# Patient Record
Sex: MALE | Race: ASIAN | NOT HISPANIC OR LATINO | Employment: FULL TIME | URBAN - METROPOLITAN AREA
[De-identification: names, ages, dates, MRNs, and addresses within clinical notes are randomized per-mention and may not be internally consistent; named-entity substitution may affect disease eponyms.]

---

## 2017-11-06 ENCOUNTER — HOSPITAL ENCOUNTER (OUTPATIENT)
Dept: LAB | Facility: MEDICAL CENTER | Age: 52
End: 2017-11-06
Attending: NURSE PRACTITIONER
Payer: COMMERCIAL

## 2017-11-06 LAB
GFR SERPL CREATININE-BSD FRML MDRD: >60 ML/MIN/1.73 M 2
TRANSFERRIN SERPL-MCNC: 240 MG/DL (ref 200–370)

## 2017-11-06 PROCEDURE — 80053 COMPREHEN METABOLIC PANEL: CPT

## 2017-11-06 PROCEDURE — 80061 LIPID PANEL: CPT

## 2017-11-06 PROCEDURE — 83036 HEMOGLOBIN GLYCOSYLATED A1C: CPT

## 2017-11-06 PROCEDURE — 36415 COLL VENOUS BLD VENIPUNCTURE: CPT

## 2017-11-06 PROCEDURE — 82306 VITAMIN D 25 HYDROXY: CPT

## 2017-11-06 PROCEDURE — 82607 VITAMIN B-12: CPT

## 2017-11-06 PROCEDURE — 83735 ASSAY OF MAGNESIUM: CPT

## 2017-11-06 PROCEDURE — 83550 IRON BINDING TEST: CPT

## 2017-11-06 PROCEDURE — 84207 ASSAY OF VITAMIN B-6: CPT

## 2017-11-06 PROCEDURE — 85025 COMPLETE CBC W/AUTO DIFF WBC: CPT

## 2017-11-06 PROCEDURE — 86225 DNA ANTIBODY NATIVE: CPT

## 2017-11-06 PROCEDURE — 86235 NUCLEAR ANTIGEN ANTIBODY: CPT

## 2017-11-06 PROCEDURE — 84443 ASSAY THYROID STIM HORMONE: CPT

## 2017-11-06 PROCEDURE — 84425 ASSAY OF VITAMIN B-1: CPT

## 2017-11-06 PROCEDURE — 86038 ANTINUCLEAR ANTIBODIES: CPT

## 2017-11-06 PROCEDURE — 82746 ASSAY OF FOLIC ACID SERUM: CPT

## 2017-11-06 PROCEDURE — 86696 HERPES SIMPLEX TYPE 2 TEST: CPT

## 2017-11-06 PROCEDURE — 86695 HERPES SIMPLEX TYPE 1 TEST: CPT

## 2017-11-06 PROCEDURE — 82728 ASSAY OF FERRITIN: CPT

## 2017-11-06 PROCEDURE — 84466 ASSAY OF TRANSFERRIN: CPT

## 2017-11-06 PROCEDURE — 86694 HERPES SIMPLEX NES ANTBDY: CPT

## 2017-11-06 PROCEDURE — 84153 ASSAY OF PSA TOTAL: CPT

## 2017-11-06 PROCEDURE — 83540 ASSAY OF IRON: CPT

## 2017-11-07 LAB
25(OH)D3 SERPL-MCNC: 13 NG/ML (ref 30–100)
ALBUMIN SERPL BCP-MCNC: 3.7 G/DL (ref 3.2–4.9)
ALBUMIN/GLOB SERPL: 1.3 G/DL
ALP SERPL-CCNC: 56 U/L (ref 30–99)
ALT SERPL-CCNC: 19 U/L (ref 2–50)
ANION GAP SERPL CALC-SCNC: 7 MMOL/L (ref 0–11.9)
AST SERPL-CCNC: 20 U/L (ref 12–45)
BASOPHILS # BLD AUTO: 0.4 % (ref 0–1.8)
BASOPHILS # BLD: 0.02 K/UL (ref 0–0.12)
BILIRUB SERPL-MCNC: 0.8 MG/DL (ref 0.1–1.5)
BUN SERPL-MCNC: 19 MG/DL (ref 8–22)
CALCIUM SERPL-MCNC: 8.8 MG/DL (ref 8.5–10.5)
CHLORIDE SERPL-SCNC: 107 MMOL/L (ref 96–112)
CHOLEST SERPL-MCNC: 171 MG/DL (ref 100–199)
CO2 SERPL-SCNC: 24 MMOL/L (ref 20–33)
CREAT SERPL-MCNC: 1 MG/DL (ref 0.5–1.4)
EOSINOPHIL # BLD AUTO: 0.1 K/UL (ref 0–0.51)
EOSINOPHIL NFR BLD: 1.9 % (ref 0–6.9)
ERYTHROCYTE [DISTWIDTH] IN BLOOD BY AUTOMATED COUNT: 36.2 FL (ref 35.9–50)
EST. AVERAGE GLUCOSE BLD GHB EST-MCNC: 114 MG/DL
FERRITIN SERPL-MCNC: 95.3 NG/ML (ref 22–322)
FOLATE SERPL-MCNC: 8.8 NG/ML
GLOBULIN SER CALC-MCNC: 2.8 G/DL (ref 1.9–3.5)
GLUCOSE SERPL-MCNC: 72 MG/DL (ref 65–99)
HBA1C MFR BLD: 5.6 % (ref 0–5.6)
HCT VFR BLD AUTO: 48.8 % (ref 42–52)
HDLC SERPL-MCNC: 62 MG/DL
HGB BLD-MCNC: 16.8 G/DL (ref 14–18)
IMM GRANULOCYTES # BLD AUTO: 0.01 K/UL (ref 0–0.11)
IMM GRANULOCYTES NFR BLD AUTO: 0.2 % (ref 0–0.9)
IRON SATN MFR SERPL: 34 % (ref 15–55)
IRON SERPL-MCNC: 114 UG/DL (ref 50–180)
LDLC SERPL CALC-MCNC: 98 MG/DL
LYMPHOCYTES # BLD AUTO: 2.41 K/UL (ref 1–4.8)
LYMPHOCYTES NFR BLD: 45 % (ref 22–41)
MAGNESIUM SERPL-MCNC: 2 MG/DL (ref 1.5–2.5)
MCH RBC QN AUTO: 28.6 PG (ref 27–33)
MCHC RBC AUTO-ENTMCNC: 34.4 G/DL (ref 33.7–35.3)
MCV RBC AUTO: 83 FL (ref 81.4–97.8)
MONOCYTES # BLD AUTO: 0.39 K/UL (ref 0–0.85)
MONOCYTES NFR BLD AUTO: 7.3 % (ref 0–13.4)
NEUTROPHILS # BLD AUTO: 2.42 K/UL (ref 1.82–7.42)
NEUTROPHILS NFR BLD: 45.2 % (ref 44–72)
NRBC # BLD AUTO: 0 K/UL
NRBC BLD AUTO-RTO: 0 /100 WBC
PLATELET # BLD AUTO: 231 K/UL (ref 164–446)
PMV BLD AUTO: 10.2 FL (ref 9–12.9)
POTASSIUM SERPL-SCNC: 3.8 MMOL/L (ref 3.6–5.5)
PROT SERPL-MCNC: 6.5 G/DL (ref 6–8.2)
PSA SERPL-MCNC: 0.46 NG/ML (ref 0–4)
RBC # BLD AUTO: 5.88 M/UL (ref 4.7–6.1)
SODIUM SERPL-SCNC: 138 MMOL/L (ref 135–145)
TIBC SERPL-MCNC: 337 UG/DL (ref 250–450)
TRIGL SERPL-MCNC: 54 MG/DL (ref 0–149)
TSH SERPL DL<=0.005 MIU/L-ACNC: 4.69 UIU/ML (ref 0.3–3.7)
VIT B12 SERPL-MCNC: 284 PG/ML (ref 211–911)
WBC # BLD AUTO: 5.4 K/UL (ref 4.8–10.8)

## 2017-11-08 LAB
HSV1 GG IGG SER-ACNC: 24.8 IV
HSV1+2 IGG SER IA-ACNC: >22.4 IV
HSV2 GG IGG SER-ACNC: 0.06 IV
NUCLEAR IGG SER QL IA: NORMAL

## 2017-11-09 LAB
VIT B1 BLD-MCNC: 126 NMOL/L (ref 70–180)
VIT B6 SERPL-MCNC: 45 NMOL/L (ref 20–125)

## 2018-09-05 ENCOUNTER — APPOINTMENT (OUTPATIENT)
Dept: RADIOLOGY | Facility: MEDICAL CENTER | Age: 53
DRG: 246 | End: 2018-09-05
Attending: EMERGENCY MEDICINE
Payer: COMMERCIAL

## 2018-09-05 ENCOUNTER — HOSPITAL ENCOUNTER (INPATIENT)
Facility: MEDICAL CENTER | Age: 53
LOS: 2 days | DRG: 246 | End: 2018-09-07
Attending: EMERGENCY MEDICINE | Admitting: INTERNAL MEDICINE
Payer: COMMERCIAL

## 2018-09-05 ENCOUNTER — APPOINTMENT (OUTPATIENT)
Dept: RADIOLOGY | Facility: MEDICAL CENTER | Age: 53
DRG: 246 | End: 2018-09-05
Payer: COMMERCIAL

## 2018-09-05 DIAGNOSIS — I21.21 ST ELEVATION MYOCARDIAL INFARCTION INVOLVING LEFT CIRCUMFLEX CORONARY ARTERY (HCC): ICD-10-CM

## 2018-09-05 LAB
ALBUMIN SERPL BCP-MCNC: 4.2 G/DL (ref 3.2–4.9)
ALBUMIN/GLOB SERPL: 1.6 G/DL
ALP SERPL-CCNC: 75 U/L (ref 30–99)
ALT SERPL-CCNC: 20 U/L (ref 2–50)
ANION GAP SERPL CALC-SCNC: 8 MMOL/L (ref 0–11.9)
APTT PPP: 21.7 SEC (ref 24.7–36)
AST SERPL-CCNC: 22 U/L (ref 12–45)
BASOPHILS # BLD AUTO: 0 % (ref 0–1.8)
BASOPHILS # BLD: 0 K/UL (ref 0–0.12)
BILIRUB SERPL-MCNC: 0.4 MG/DL (ref 0.1–1.5)
BUN SERPL-MCNC: 20 MG/DL (ref 8–22)
CALCIUM SERPL-MCNC: 9.5 MG/DL (ref 8.5–10.5)
CHLORIDE SERPL-SCNC: 108 MMOL/L (ref 96–112)
CO2 SERPL-SCNC: 25 MMOL/L (ref 20–33)
CREAT SERPL-MCNC: 1.29 MG/DL (ref 0.5–1.4)
EKG IMPRESSION: NORMAL
EOSINOPHIL # BLD AUTO: 0.17 K/UL (ref 0–0.51)
EOSINOPHIL NFR BLD: 1.7 % (ref 0–6.9)
ERYTHROCYTE [DISTWIDTH] IN BLOOD BY AUTOMATED COUNT: 36.5 FL (ref 35.9–50)
GLOBULIN SER CALC-MCNC: 2.6 G/DL (ref 1.9–3.5)
GLUCOSE SERPL-MCNC: 140 MG/DL (ref 65–99)
HCT VFR BLD AUTO: 43.9 % (ref 42–52)
HGB BLD-MCNC: 15.1 G/DL (ref 14–18)
INR PPP: 1.04 (ref 0.87–1.13)
LIPASE SERPL-CCNC: 26 U/L (ref 11–82)
LYMPHOCYTES # BLD AUTO: 4.88 K/UL (ref 1–4.8)
LYMPHOCYTES NFR BLD: 47.8 % (ref 22–41)
MANUAL DIFF BLD: NORMAL
MCH RBC QN AUTO: 28 PG (ref 27–33)
MCHC RBC AUTO-ENTMCNC: 34.4 G/DL (ref 33.7–35.3)
MCV RBC AUTO: 81.4 FL (ref 81.4–97.8)
MONOCYTES # BLD AUTO: 0.71 K/UL (ref 0–0.85)
MONOCYTES NFR BLD AUTO: 7 % (ref 0–13.4)
MORPHOLOGY BLD-IMP: NORMAL
NEUTROPHILS # BLD AUTO: 4.44 K/UL (ref 1.82–7.42)
NEUTROPHILS NFR BLD: 43.5 % (ref 44–72)
NRBC # BLD AUTO: 0 K/UL
NRBC BLD-RTO: 0 /100 WBC
PLATELET # BLD AUTO: 246 K/UL (ref 164–446)
PLATELET BLD QL SMEAR: NORMAL
PMV BLD AUTO: 9.9 FL (ref 9–12.9)
POTASSIUM SERPL-SCNC: 3.3 MMOL/L (ref 3.6–5.5)
PROT SERPL-MCNC: 6.8 G/DL (ref 6–8.2)
PROTHROMBIN TIME: 13.3 SEC (ref 12–14.6)
RBC # BLD AUTO: 5.39 M/UL (ref 4.7–6.1)
RBC BLD AUTO: NORMAL
SODIUM SERPL-SCNC: 141 MMOL/L (ref 135–145)
TROPONIN I SERPL-MCNC: 0.11 NG/ML (ref 0–0.04)
WBC # BLD AUTO: 10.2 K/UL (ref 4.8–10.8)

## 2018-09-05 PROCEDURE — 85610 PROTHROMBIN TIME: CPT

## 2018-09-05 PROCEDURE — C1769 GUIDE WIRE: HCPCS

## 2018-09-05 PROCEDURE — 307093 HCHG TR BAND RADIAL

## 2018-09-05 PROCEDURE — 99291 CRITICAL CARE FIRST HOUR: CPT

## 2018-09-05 PROCEDURE — 85730 THROMBOPLASTIN TIME PARTIAL: CPT

## 2018-09-05 PROCEDURE — 93458 L HRT ARTERY/VENTRICLE ANGIO: CPT

## 2018-09-05 PROCEDURE — 99152 MOD SED SAME PHYS/QHP 5/>YRS: CPT

## 2018-09-05 PROCEDURE — 84484 ASSAY OF TROPONIN QUANT: CPT

## 2018-09-05 PROCEDURE — C1887 CATHETER, GUIDING: HCPCS

## 2018-09-05 PROCEDURE — 83880 ASSAY OF NATRIURETIC PEPTIDE: CPT

## 2018-09-05 PROCEDURE — 85347 COAGULATION TIME ACTIVATED: CPT

## 2018-09-05 PROCEDURE — 4A023N7 MEASUREMENT OF CARDIAC SAMPLING AND PRESSURE, LEFT HEART, PERCUTANEOUS APPROACH: ICD-10-PCS | Performed by: INTERNAL MEDICINE

## 2018-09-05 PROCEDURE — C1725 CATH, TRANSLUMIN NON-LASER: HCPCS

## 2018-09-05 PROCEDURE — 027034Z DILATION OF CORONARY ARTERY, ONE ARTERY WITH DRUG-ELUTING INTRALUMINAL DEVICE, PERCUTANEOUS APPROACH: ICD-10-PCS | Performed by: INTERNAL MEDICINE

## 2018-09-05 PROCEDURE — B2111ZZ FLUOROSCOPY OF MULTIPLE CORONARY ARTERIES USING LOW OSMOLAR CONTRAST: ICD-10-PCS | Performed by: INTERNAL MEDICINE

## 2018-09-05 PROCEDURE — C1894 INTRO/SHEATH, NON-LASER: HCPCS

## 2018-09-05 PROCEDURE — 93005 ELECTROCARDIOGRAM TRACING: CPT | Performed by: EMERGENCY MEDICINE

## 2018-09-05 PROCEDURE — 99153 MOD SED SAME PHYS/QHP EA: CPT

## 2018-09-05 PROCEDURE — 770022 HCHG ROOM/CARE - ICU (200)

## 2018-09-05 PROCEDURE — 80053 COMPREHEN METABOLIC PANEL: CPT

## 2018-09-05 PROCEDURE — 360979 HCHG DIAGNOSTIC CATH

## 2018-09-05 PROCEDURE — 85027 COMPLETE CBC AUTOMATED: CPT

## 2018-09-05 PROCEDURE — 5A2204Z RESTORATION OF CARDIAC RHYTHM, SINGLE: ICD-10-PCS | Performed by: INTERNAL MEDICINE

## 2018-09-05 PROCEDURE — 83690 ASSAY OF LIPASE: CPT

## 2018-09-05 PROCEDURE — C1874 STENT, COATED/COV W/DEL SYS: HCPCS

## 2018-09-05 PROCEDURE — C9606 PERC D-E COR REVASC W AMI S: HCPCS | Mod: LC

## 2018-09-05 PROCEDURE — 304952 HCHG R 2 PADS

## 2018-09-05 PROCEDURE — B2151ZZ FLUOROSCOPY OF LEFT HEART USING LOW OSMOLAR CONTRAST: ICD-10-PCS | Performed by: INTERNAL MEDICINE

## 2018-09-05 PROCEDURE — 93005 ELECTROCARDIOGRAM TRACING: CPT | Performed by: INTERNAL MEDICINE

## 2018-09-05 PROCEDURE — 3E033PZ INTRODUCTION OF PLATELET INHIBITOR INTO PERIPHERAL VEIN, PERCUTANEOUS APPROACH: ICD-10-PCS | Performed by: INTERNAL MEDICINE

## 2018-09-05 PROCEDURE — 85007 BL SMEAR W/DIFF WBC COUNT: CPT

## 2018-09-05 PROCEDURE — 71045 X-RAY EXAM CHEST 1 VIEW: CPT

## 2018-09-05 RX ORDER — EPTIFIBATIDE 0.75 MG/ML
INJECTION, SOLUTION INTRAVENOUS
Status: COMPLETED
Start: 2018-09-05 | End: 2018-09-06

## 2018-09-05 RX ORDER — VERAPAMIL HYDROCHLORIDE 2.5 MG/ML
INJECTION, SOLUTION INTRAVENOUS
Status: COMPLETED
Start: 2018-09-05 | End: 2018-09-06

## 2018-09-05 RX ORDER — NOREPINEPHRINE BITARTRATE 1 MG/ML
INJECTION, SOLUTION INTRAVENOUS
Status: COMPLETED
Start: 2018-09-05 | End: 2018-09-06

## 2018-09-05 RX ORDER — HEPARIN SODIUM,PORCINE 1000/ML
VIAL (ML) INJECTION
Status: COMPLETED
Start: 2018-09-05 | End: 2018-09-06

## 2018-09-05 RX ORDER — MIDAZOLAM HYDROCHLORIDE 1 MG/ML
INJECTION INTRAMUSCULAR; INTRAVENOUS
Status: COMPLETED
Start: 2018-09-05 | End: 2018-09-06

## 2018-09-05 RX ORDER — ONDANSETRON 2 MG/ML
INJECTION INTRAMUSCULAR; INTRAVENOUS
Status: COMPLETED
Start: 2018-09-05 | End: 2018-09-06

## 2018-09-05 RX ORDER — PRASUGREL 10 MG/1
TABLET, FILM COATED ORAL
Status: COMPLETED
Start: 2018-09-05 | End: 2018-09-06

## 2018-09-05 RX ORDER — LIDOCAINE HYDROCHLORIDE 10 MG/ML
INJECTION, SOLUTION INFILTRATION; PERINEURAL
Status: COMPLETED
Start: 2018-09-05 | End: 2018-09-06

## 2018-09-05 NOTE — LETTER
"  FORM C-4:  EMPLOYEE’S CLAIM FOR COMPENSATION/ REPORT OF INITIAL TREATMENT  EMPLOYEE’S CLAIM - PROVIDE ALL INFORMATION REQUESTED   First Name  Jayson Last Name  Calli Birthdate             Age  1965 53 y.o. Sex  male Claim Number   Home Employee Address  [address]  [facility]                                     Zip  [address] Height  1.727 m (5' 8\") Weight  80.5 kg (177 lb 7.5 oz) [facility]     Mailing Employee Address                           [address]   [facility]               Zip  [address] Telephone  [phone] (home)  Primary Language Spoken  ENGLISH   Insurer  [employer]   [employer] Employee's Occupation (Job Title) When Injury or Occupational Disease Occurred     Employer's Name  [employer] Telephone  [phone]    Employer Address  [address] [place] [address] [29] Zip  [address]   Date of Injury  9/5/2018       Hour of Injury  10:00 PM Date Employer Notified  9/5/2018 Last Day of Work after Injury or Occupational Disease  9/5/2018 Supervisor to Whom Injury Reported  TONIA JOHNSON   Address or Location of Accident (if applicable)  [[address] [place] [address]]   What were you doing at the time of accident? (if applicable)  IMMEDIATELY AFTER PHYSICALLY DEALING WITH A PHYSICALLY RESISTIVE INDIVIDUAL, I STARTED EXPERIANCING CHEST PAINS AND SWETTING.    How did this injury or occupational disease occur? Be specific and answer in detail. Use additional sheet if necessary)  AT 10 PM, STARTED EXPERIANCING EXTREME CHEST PAIN AND PROFUSE SWEATING. AMBULANCE WAS CALLED AND I WAS TRANSPORTED TO [facility] WHERE IT WAS DETERMINED I HAD A HEART ATTACK.    If you believe that you have an occupational disease, when did you first have knowledge of the disability and it relationship to your employment?  N/A Witnesses to the Accident  NATE GENTRY     Nature of Injury or Occupational Disease  Myocardial " Infarction  Part(s) of Body Injured or Affected  Heart, N/A, N/A    I certify that the above is true and correct to the best of my knowledge and that I have provided this information in order to obtain the benefits of [place] or [place] of [place]  I hereby authorize any physician, chiropractor, surgeon, practitioner, or other person, any hospital, including [facility] hospital, any medical service organization, any insurance company, or other institution or organization to release to each other, any medical or other information, including benefits paid or payable, pertinent to this injury or disease, except information relative to diagnosis, treatment and/or counseling for AIDS, psychological conditions, alcohol or controlled substances, for which I must give specific authorization.  A Photostat of this authorization shall be as valid as the original.   Date Place  [facility] [place] Employee’s Signature   THIS REPORT MUST BE COMPLETED AND MAILED WITHIN 3 WORKING DAYS OF TREATMENT   Place  [facility]  Name of Facility   [facility]   Date  9/5/2018 Diagnosis  (I21.21) ST elevation myocardial infarction involving left circumflex coronary artery (HCC) Is there evidence the injured employee was under the influence of alcohol and/or another controlled substance at the time of accident?   Hour  11:53 AM Description of Injury or Disease  ST elevation myocardial infarction involving left circumflex coronary artery (HCC)     Treatment     Have you advised the patient to remain off work five days or more?             X-Ray Findings      If Yes   From Date    To Date      From information given by the employee, together with medical evidence, can you directly connect this injury or occupational disease as job incurred?    If No, is the employee capable of: Full Duty    Modified Duty       "  Is additional medical care by a physician indicated?    If Modified Duty, Specify any Limitations / Restrictions        Do you know of any previous injury or disease contributing to this condition or occupational disease?      Date  9/10/2018 Print Doctor’s Name  Gretchen Tucker KEITH certify the employer’s copy of this form was mailed on:   Address  [address] [address] [phone] Insurer’s Use Only   [facility]  [id number]    Provider’s Tax ID Number  [id number] Telephone  Dept: [phone]    Doctor’s Signature    Degree       Original - TREATING PHYSICIAN OR CHIROPRACTOR   Pg 2-Insurer/TPA   Pg 3-Employer   Pg 4-Employee                                                                                                  Form C-4 (rev01/03)   BRIEF DESCRIPTION OF RIGHTS AND BENEFITS  (Pursuant to [place] 616C.050)  Notice of Injury or Occupational Disease (Incident Report Form C-1): If an injury or occupational disease (OD) arises out of and in the course of employment, you must provide written notice to your employer as soon as practicable, but no later than 7 days after the accident or OD. Your employer shall maintain a sufficient supply of the required forms.    Claim for Compensation (Form C-4): If medical treatment is sought, the form C-4 is available at the place of initial treatment. A completed \"Claim for Compensation\" (Form C-4) must be filed within 90 days after an accident or OD. The treating physician or chiropractor must, within 3 working days after treatment, complete and mail to the employer, the employer's insurer and third-party , the Claim for Compensation.    Medical Treatment: If you require medical treatment for your on-the-job injury or OD, you may be required to select a physician or chiropractor from a list provided by your workers’ compensation insurer, if it has contracted with an Organization for Managed Care (MCO) or Preferred Provider " Organization (PPO) or providers of health care. If your employer has not entered into a contract with an MCO or PPO, you may select a physician or chiropractor from the Panel of Physicians and Chiropractors. Any medical costs related to your industrial injury or OD will be paid by your insurer.    Temporary Total Disability (TTD): If your doctor has certified that you are unable to work for a period of at least 5 consecutive days, or 5 cumulative days in a 20-day period, or places restrictions on you that your employer does not accommodate, you may be entitled to TTD compensation.    Temporary Partial Disability (TPD): If the wage you receive upon reemployment is less than the compensation for TTD to which you are entitled, the insurer may be required to pay you TPD compensation to make up the difference. TPD can only be paid for a maximum of 24 months.    Permanent Partial Disability (PPD): When your medical condition is stable and there is an indication of a PPD as a result of your injury or OD, within 30 days, your insurer must arrange for an evaluation by a rating physician or chiropractor to determine the degree of your PPD. The amount of your PPD award depends on the date of injury, the results of the PPD evaluation and your age and wage.    Permanent Total Disability (PTD): If you are medically certified by a treating physician or chiropractor as permanently and totally disabled and have been granted a PTD status by your insurer, you are entitled to receive monthly benefits not to exceed 66 2/3% of your average monthly wage. The amount of your PTD payments is subject to reduction if you previously received a PPD award.    Vocational Rehabilitation Services: You may be eligible for vocational rehabilitation services if you are unable to return to the job due to a permanent physical impairment or permanent restrictions as a result of your injury or occupational disease.    Transportation and Per Elizabeth  Reimbursement: You may be eligible for travel expenses and per hafsa associated with medical treatment.  Reopening: You may be able to reopen your claim if your condition worsens after claim closure.    Appeal Process: If you disagree with a written determination issued by the insurer or the insurer does not respond to your request, you may appeal to the Department of Administration, , by following the instructions contained in your determination letter. You must appeal the determination within 70 days from the date of the determination letter at [address], [address], [place], [place] [address], or [address], [address], [place], [place] [address]. If you disagree with the  decision, you may appeal to the Department of Administration, . You must file your appeal within 30 days from the date of the  decision letter at [address], [address], [place], [place] [address], or [address] [facility] [address], [place], [place] [address]. If you disagree with a decision of an , you may file a petition for judicial review with the District Court. You must do so within 30 days of the Appeal Officer’s decision. You may be represented by an  at your own expense or you may contact the [facility] for possible representation.    [place]  for Injured Workers (NAIW): If you disagree with a  decision, you may request that NAIW represent you without charge at an  Hearing. For information regarding denial of benefits, you may contact the [facility] at: [address], [address], [place] [address], [phone], or [address] [facility] [address], [place] [address], [phone]    To File a Complaint with the Division: If you wish to file a complaint with the  of the Division of Industrial Relations (DIR), please contact the Workers’ Compensation Section, [address] [place] [address]  [address], [address], [place], [place] [address], telephone [phone], or [address], [address], [place], [place] [address], telephone [phone].    For assistance with Workers’ Compensation Issues: you may contact the Office of the Governor Consumer Health Assistance, [address] [place] [address], [address], [place], [place] [address], Toll Free 1-210.828.8227, Web site: http://govcha.[facility].nv., E-mail [email or web address].                                                                                                                                                                         __________________________________________________________________                                    _________________            Employee Name / Signature                                                                                                                            Date                                       D-2 (rev. 10/07)

## 2018-09-06 ENCOUNTER — PATIENT OUTREACH (OUTPATIENT)
Dept: HEALTH INFORMATION MANAGEMENT | Facility: OTHER | Age: 53
End: 2018-09-06

## 2018-09-06 PROBLEM — E66.3 OVERWEIGHT: Status: ACTIVE | Noted: 2018-09-06

## 2018-09-06 PROBLEM — Z79.899 HIGH RISK MEDICATION USE: Status: ACTIVE | Noted: 2018-09-06

## 2018-09-06 PROBLEM — I25.10 CAD (CORONARY ARTERY DISEASE): Status: ACTIVE | Noted: 2018-09-06

## 2018-09-06 PROBLEM — I21.3 STEMI (ST ELEVATION MYOCARDIAL INFARCTION) (HCC): Status: ACTIVE | Noted: 2018-09-06

## 2018-09-06 LAB
ACT BLD: 158 SEC (ref 74–137)
ACT BLD: 180 SEC (ref 74–137)
ANION GAP SERPL CALC-SCNC: 6 MMOL/L (ref 0–11.9)
BNP SERPL-MCNC: 11 PG/ML (ref 0–100)
BUN SERPL-MCNC: 18 MG/DL (ref 8–22)
CALCIUM SERPL-MCNC: 8.1 MG/DL (ref 8.5–10.5)
CHLORIDE SERPL-SCNC: 111 MMOL/L (ref 96–112)
CHOLEST SERPL-MCNC: 130 MG/DL (ref 100–199)
CO2 SERPL-SCNC: 21 MMOL/L (ref 20–33)
CREAT SERPL-MCNC: 1.03 MG/DL (ref 0.5–1.4)
EKG IMPRESSION: NORMAL
EKG IMPRESSION: NORMAL
ERYTHROCYTE [DISTWIDTH] IN BLOOD BY AUTOMATED COUNT: 36 FL (ref 35.9–50)
GLUCOSE SERPL-MCNC: 128 MG/DL (ref 65–99)
HCT VFR BLD AUTO: 38.9 % (ref 42–52)
HDLC SERPL-MCNC: 49 MG/DL
HGB BLD-MCNC: 13.5 G/DL (ref 14–18)
LDLC SERPL CALC-MCNC: 63 MG/DL
MAGNESIUM SERPL-MCNC: 2 MG/DL (ref 1.5–2.5)
MCH RBC QN AUTO: 28 PG (ref 27–33)
MCHC RBC AUTO-ENTMCNC: 34.7 G/DL (ref 33.7–35.3)
MCV RBC AUTO: 80.7 FL (ref 81.4–97.8)
PLATELET # BLD AUTO: 201 K/UL (ref 164–446)
PMV BLD AUTO: 9.9 FL (ref 9–12.9)
POTASSIUM SERPL-SCNC: 4 MMOL/L (ref 3.6–5.5)
RBC # BLD AUTO: 4.82 M/UL (ref 4.7–6.1)
SODIUM SERPL-SCNC: 138 MMOL/L (ref 135–145)
TRIGL SERPL-MCNC: 90 MG/DL (ref 0–149)
TROPONIN I SERPL-MCNC: 39.5 NG/ML (ref 0–0.04)
WBC # BLD AUTO: 10.3 K/UL (ref 4.8–10.8)

## 2018-09-06 PROCEDURE — 700101 HCHG RX REV CODE 250

## 2018-09-06 PROCEDURE — 93005 ELECTROCARDIOGRAM TRACING: CPT | Performed by: INTERNAL MEDICINE

## 2018-09-06 PROCEDURE — 80061 LIPID PANEL: CPT

## 2018-09-06 PROCEDURE — 85027 COMPLETE CBC AUTOMATED: CPT

## 2018-09-06 PROCEDURE — 700102 HCHG RX REV CODE 250 W/ 637 OVERRIDE(OP): Performed by: INTERNAL MEDICINE

## 2018-09-06 PROCEDURE — 700105 HCHG RX REV CODE 258: Performed by: INTERNAL MEDICINE

## 2018-09-06 PROCEDURE — 770020 HCHG ROOM/CARE - TELE (206)

## 2018-09-06 PROCEDURE — 700102 HCHG RX REV CODE 250 W/ 637 OVERRIDE(OP)

## 2018-09-06 PROCEDURE — 700111 HCHG RX REV CODE 636 W/ 250 OVERRIDE (IP)

## 2018-09-06 PROCEDURE — 83735 ASSAY OF MAGNESIUM: CPT

## 2018-09-06 PROCEDURE — 99223 1ST HOSP IP/OBS HIGH 75: CPT | Performed by: HOSPITALIST

## 2018-09-06 PROCEDURE — A9270 NON-COVERED ITEM OR SERVICE: HCPCS | Performed by: INTERNAL MEDICINE

## 2018-09-06 PROCEDURE — 700111 HCHG RX REV CODE 636 W/ 250 OVERRIDE (IP): Performed by: INTERNAL MEDICINE

## 2018-09-06 PROCEDURE — 93010 ELECTROCARDIOGRAM REPORT: CPT | Mod: 76 | Performed by: INTERNAL MEDICINE

## 2018-09-06 PROCEDURE — 80048 BASIC METABOLIC PNL TOTAL CA: CPT

## 2018-09-06 PROCEDURE — 99291 CRITICAL CARE FIRST HOUR: CPT | Performed by: INTERNAL MEDICINE

## 2018-09-06 PROCEDURE — A9270 NON-COVERED ITEM OR SERVICE: HCPCS

## 2018-09-06 PROCEDURE — 84484 ASSAY OF TROPONIN QUANT: CPT

## 2018-09-06 RX ORDER — PRASUGREL 10 MG/1
60 TABLET, FILM COATED ORAL ONCE
Status: DISPENSED | OUTPATIENT
Start: 2018-09-06 | End: 2018-09-07

## 2018-09-06 RX ORDER — ROSUVASTATIN CALCIUM 10 MG/1
20 TABLET, COATED ORAL EVERY EVENING
Status: DISCONTINUED | OUTPATIENT
Start: 2018-09-06 | End: 2018-09-06

## 2018-09-06 RX ORDER — PRASUGREL 10 MG/1
10 TABLET, FILM COATED ORAL DAILY
Status: DISCONTINUED | OUTPATIENT
Start: 2018-09-07 | End: 2018-09-07 | Stop reason: HOSPADM

## 2018-09-06 RX ORDER — LOSARTAN POTASSIUM 25 MG/1
25 TABLET ORAL
Status: DISCONTINUED | OUTPATIENT
Start: 2018-09-06 | End: 2018-09-07 | Stop reason: HOSPADM

## 2018-09-06 RX ORDER — ONDANSETRON 2 MG/ML
4 INJECTION INTRAMUSCULAR; INTRAVENOUS EVERY 4 HOURS PRN
Status: DISCONTINUED | OUTPATIENT
Start: 2018-09-05 | End: 2018-09-07 | Stop reason: HOSPADM

## 2018-09-06 RX ORDER — HALOPERIDOL 5 MG/ML
1 INJECTION INTRAMUSCULAR EVERY 6 HOURS PRN
Status: DISCONTINUED | OUTPATIENT
Start: 2018-09-05 | End: 2018-09-07 | Stop reason: HOSPADM

## 2018-09-06 RX ORDER — SODIUM CHLORIDE 9 MG/ML
INJECTION, SOLUTION INTRAVENOUS CONTINUOUS
Status: DISPENSED | OUTPATIENT
Start: 2018-09-06 | End: 2018-09-06

## 2018-09-06 RX ORDER — POTASSIUM CHLORIDE 20 MEQ/1
20 TABLET, EXTENDED RELEASE ORAL 2 TIMES DAILY
Status: COMPLETED | OUTPATIENT
Start: 2018-09-06 | End: 2018-09-06

## 2018-09-06 RX ORDER — CARVEDILOL 6.25 MG/1
6.25 TABLET ORAL 2 TIMES DAILY WITH MEALS
Status: DISCONTINUED | OUTPATIENT
Start: 2018-09-06 | End: 2018-09-07 | Stop reason: HOSPADM

## 2018-09-06 RX ORDER — ROSUVASTATIN CALCIUM 20 MG/1
40 TABLET, COATED ORAL EVERY EVENING
Status: DISCONTINUED | OUTPATIENT
Start: 2018-09-06 | End: 2018-09-07 | Stop reason: HOSPADM

## 2018-09-06 RX ORDER — EPTIFIBATIDE 0.75 MG/ML
2 INJECTION, SOLUTION INTRAVENOUS CONTINUOUS
Status: DISCONTINUED | OUTPATIENT
Start: 2018-09-06 | End: 2018-09-06

## 2018-09-06 RX ORDER — DEXTROSE MONOHYDRATE 50 MG/ML
INJECTION, SOLUTION INTRAVENOUS CONTINUOUS
Status: DISCONTINUED | OUTPATIENT
Start: 2018-09-06 | End: 2018-09-06

## 2018-09-06 RX ORDER — DIPHENHYDRAMINE HYDROCHLORIDE 50 MG/ML
25 INJECTION INTRAMUSCULAR; INTRAVENOUS EVERY 6 HOURS PRN
Status: DISCONTINUED | OUTPATIENT
Start: 2018-09-05 | End: 2018-09-07 | Stop reason: HOSPADM

## 2018-09-06 RX ORDER — DEXAMETHASONE SODIUM PHOSPHATE 4 MG/ML
4 INJECTION, SOLUTION INTRA-ARTICULAR; INTRALESIONAL; INTRAMUSCULAR; INTRAVENOUS; SOFT TISSUE
Status: DISCONTINUED | OUTPATIENT
Start: 2018-09-05 | End: 2018-09-07 | Stop reason: HOSPADM

## 2018-09-06 RX ORDER — SCOLOPAMINE TRANSDERMAL SYSTEM 1 MG/1
1 PATCH, EXTENDED RELEASE TRANSDERMAL
Status: DISCONTINUED | OUTPATIENT
Start: 2018-09-05 | End: 2018-09-07 | Stop reason: HOSPADM

## 2018-09-06 RX ORDER — PRASUGREL 10 MG/1
TABLET, FILM COATED ORAL
Status: COMPLETED
Start: 2018-09-06 | End: 2018-09-06

## 2018-09-06 RX ADMIN — PRASUGREL 60 MG: 10 TABLET, FILM COATED ORAL at 00:10

## 2018-09-06 RX ADMIN — MIDAZOLAM HYDROCHLORIDE 2 MG: 1 INJECTION, SOLUTION INTRAMUSCULAR; INTRAVENOUS at 00:06

## 2018-09-06 RX ADMIN — LOSARTAN POTASSIUM 25 MG: 25 TABLET, FILM COATED ORAL at 13:29

## 2018-09-06 RX ADMIN — HEPARIN SODIUM: 1000 INJECTION, SOLUTION INTRAVENOUS; SUBCUTANEOUS at 00:05

## 2018-09-06 RX ADMIN — EPTIFIBATIDE 14.4 MG: 2 INJECTION, SOLUTION INTRAVENOUS at 00:08

## 2018-09-06 RX ADMIN — POTASSIUM CHLORIDE 20 MEQ: 1500 TABLET, EXTENDED RELEASE ORAL at 05:39

## 2018-09-06 RX ADMIN — VERAPAMIL HYDROCHLORIDE 2.5 MG: 2.5 INJECTION, SOLUTION INTRAVENOUS at 00:08

## 2018-09-06 RX ADMIN — ROSUVASTATIN CALCIUM 40 MG: 10 TABLET, FILM COATED ORAL at 17:31

## 2018-09-06 RX ADMIN — ONDANSETRON 4 MG: 2 INJECTION, SOLUTION INTRAMUSCULAR; INTRAVENOUS at 00:09

## 2018-09-06 RX ADMIN — EPTIFIBATIDE 2 MCG/KG/MIN: 0.75 INJECTION, SOLUTION INTRAVENOUS at 01:10

## 2018-09-06 RX ADMIN — AMIODARONE HYDROCHLORIDE 1 MG/MIN: 50 INJECTION, SOLUTION INTRAVENOUS at 01:34

## 2018-09-06 RX ADMIN — SODIUM CHLORIDE: 9 INJECTION, SOLUTION INTRAVENOUS at 00:43

## 2018-09-06 RX ADMIN — HEPARIN SODIUM 2000 UNITS: 200 INJECTION, SOLUTION INTRAVENOUS at 00:05

## 2018-09-06 RX ADMIN — ASPIRIN 81 MG: 81 TABLET, DELAYED RELEASE ORAL at 05:39

## 2018-09-06 RX ADMIN — CARVEDILOL 6.25 MG: 6.25 TABLET, FILM COATED ORAL at 17:31

## 2018-09-06 RX ADMIN — NITROGLYCERIN 10 ML: 20 INJECTION INTRAVENOUS at 00:05

## 2018-09-06 RX ADMIN — DEXTROSE MONOHYDRATE: 50 INJECTION, SOLUTION INTRAVENOUS at 01:17

## 2018-09-06 RX ADMIN — POTASSIUM CHLORIDE 20 MEQ: 1500 TABLET, EXTENDED RELEASE ORAL at 17:32

## 2018-09-06 RX ADMIN — NOREPINEPHRINE BITARTRATE 8 MG: 1 INJECTION INTRAVENOUS at 00:09

## 2018-09-06 RX ADMIN — ATROPINE SULFATE 1 MG: 0.1 INJECTION PARENTERAL at 00:08

## 2018-09-06 RX ADMIN — LIDOCAINE HYDROCHLORIDE: 10 INJECTION, SOLUTION INFILTRATION; PERINEURAL at 00:06

## 2018-09-06 RX ADMIN — AMIODARONE HYDROCHLORIDE 150 MG: 1.5 INJECTION, SOLUTION INTRAVENOUS at 01:17

## 2018-09-06 RX ADMIN — EPTIFIBATIDE 74996.25 MCG: 0.75 INJECTION, SOLUTION INTRAVENOUS at 00:07

## 2018-09-06 RX ADMIN — EPTIFIBATIDE 14.4 MG: 2 INJECTION, SOLUTION INTRAVENOUS at 00:10

## 2018-09-06 ASSESSMENT — COGNITIVE AND FUNCTIONAL STATUS - GENERAL
SUGGESTED CMS G CODE MODIFIER MOBILITY: CJ
DAILY ACTIVITIY SCORE: 23
WALKING IN HOSPITAL ROOM: A LITTLE
CLIMB 3 TO 5 STEPS WITH RAILING: A LITTLE
MOBILITY SCORE: 22
SUGGESTED CMS G CODE MODIFIER DAILY ACTIVITY: CI
HELP NEEDED FOR BATHING: A LITTLE

## 2018-09-06 ASSESSMENT — ENCOUNTER SYMPTOMS
BLOOD IN STOOL: 0
PSYCHIATRIC NEGATIVE: 1
HALLUCINATIONS: 0
EYE DISCHARGE: 0
FEVER: 0
NEUROLOGICAL NEGATIVE: 1
CHILLS: 0
DIZZINESS: 0
VOMITING: 0
DOUBLE VISION: 0
ABDOMINAL PAIN: 0
SENSORY CHANGE: 0
EYE PAIN: 0
COUGH: 0
CLAUDICATION: 0
BRUISES/BLEEDS EASILY: 0
BLURRED VISION: 0
RESPIRATORY NEGATIVE: 1
PALPITATIONS: 0
DEPRESSION: 0
ORTHOPNEA: 0
MYALGIAS: 0
DIAPHORESIS: 1
MUSCULOSKELETAL NEGATIVE: 1
SHORTNESS OF BREATH: 0
EYES NEGATIVE: 1
SPEECH CHANGE: 0
FALLS: 0
HEADACHES: 0
NAUSEA: 0
GASTROINTESTINAL NEGATIVE: 1
PND: 0
WEIGHT LOSS: 0
LOSS OF CONSCIOUSNESS: 0

## 2018-09-06 ASSESSMENT — PATIENT HEALTH QUESTIONNAIRE - PHQ9
SUM OF ALL RESPONSES TO PHQ9 QUESTIONS 1 AND 2: 0
2. FEELING DOWN, DEPRESSED, IRRITABLE, OR HOPELESS: NOT AT ALL
2. FEELING DOWN, DEPRESSED, IRRITABLE, OR HOPELESS: NOT AT ALL
1. LITTLE INTEREST OR PLEASURE IN DOING THINGS: NOT AT ALL
SUM OF ALL RESPONSES TO PHQ9 QUESTIONS 1 AND 2: 0
1. LITTLE INTEREST OR PLEASURE IN DOING THINGS: NOT AT ALL

## 2018-09-06 ASSESSMENT — LIFESTYLE VARIABLES
EVER HAD A DRINK FIRST THING IN THE MORNING TO STEADY YOUR NERVES TO GET RID OF A HANGOVER: NO
EVER FELT BAD OR GUILTY ABOUT YOUR DRINKING: NO
TOTAL SCORE: 0
ON A TYPICAL DAY WHEN YOU DRINK ALCOHOL HOW MANY DRINKS DO YOU HAVE: 2
AVERAGE NUMBER OF DAYS PER WEEK YOU HAVE A DRINK CONTAINING ALCOHOL: 1
HOW MANY TIMES IN THE PAST YEAR HAVE YOU HAD 5 OR MORE DRINKS IN A DAY: 0
TOTAL SCORE: 0
HAVE PEOPLE ANNOYED YOU BY CRITICIZING YOUR DRINKING: NO
TOTAL SCORE: 0
ALCOHOL_USE: YES
CONSUMPTION TOTAL: NEGATIVE
HAVE YOU EVER FELT YOU SHOULD CUT DOWN ON YOUR DRINKING: NO

## 2018-09-06 ASSESSMENT — PAIN SCALES - GENERAL
PAINLEVEL_OUTOF10: 0

## 2018-09-06 ASSESSMENT — COPD QUESTIONNAIRES
DURING THE PAST 4 WEEKS HOW MUCH DID YOU FEEL SHORT OF BREATH: NONE/LITTLE OF THE TIME
COPD SCREENING SCORE: 1
DO YOU EVER COUGH UP ANY MUCUS OR PHLEGM?: NO/ONLY WITH OCCASIONAL COLDS OR INFECTIONS
IN THE PAST 12 MONTHS DO YOU DO LESS THAN YOU USED TO BECAUSE OF YOUR BREATHING PROBLEMS: DISAGREE/UNSURE
HAVE YOU SMOKED AT LEAST 100 CIGARETTES IN YOUR ENTIRE LIFE: NO/DON'T KNOW

## 2018-09-06 NOTE — ED PROVIDER NOTES
"ED Provider Note    Scribed for Gretchen Tucker M.D. by Obed Luciano. 9/5/2018, 10:50 PM.    Primary care provider: Kalin Ash M.D.  Means of arrival: [facility]  History obtained from: Patient  History limited by: None    CHIEF COMPLAINT  STEMI    HPI  Jayson Mccurdy is a 53 y.o. male who presents to the Emergency Department for evaluation of 8/10 central chest pain onset about 45 minutes ago. The patient reports that he is a  at the [facility] and was trying to contain a combative guest when the chest pain began. Per EMS, the patient was given 324 mg of aspirin en route to the hospital which the patient reported helped his alleviate his pain, as it is currently at a 6/10. The patient confirms that his dad had a heart attack in his 50's. He denies cigarette use, diabetes, hypertension, high cholesterol, and previous heart problems. He also denies jaw pain, neck pain, and left arm pain.     REVIEW OF SYSTEMS  Pertinent positives include chest pain. Pertinent negatives include no cigarette use, jaw pain, neck pain, and left arm pain.  All other systems reviewed and negative.  C.     PAST MEDICAL HISTORY   history of kidney stones    SURGICAL HISTORY  patient denies any surgical history    SOCIAL HISTORY  Social History   Substance Use Topics   • Smoking status: Never smoker   • Smokeless tobacco: Never used   • Alcohol use Social      History   Drug use: Unknown       FAMILY HISTORY  Father had heart attack in his 50s    CURRENT MEDICATIONS  Home Medications    **Home medications have not yet been reviewed for this encounter**         ALLERGIES  No Known Allergies    PHYSICAL EXAM  VITAL SIGNS: Ht 1.727 m (5' 8\")   Wt 79.4 kg (175 lb)   BMI 26.61 kg/m²   Constitutional: Alert in no apparent distress.  HENT: No signs of trauma, Bilateral external ears normal, Nose normal.   Eyes: Pupils are equal and reactive, Conjunctiva normal, Non-icteric.   Neck: Normal range of motion, No tenderness, " Supple, No stridor.   Cardiovascular: Regular rate and rhythm, no murmurs.   Thorax & Lungs: Normal breath sounds, No respiratory distress, No wheezing, No chest tenderness.   Abdomen: Bowel sounds normal, Soft, No tenderness, No masses, No peritoneal signs.  Skin: Warm, Dry, No erythema, No rash.   Musculoskeletal:  No major deformities noted.   Neurologic: Alert, moving all extremities without difficulty, no focal deficits.    LABS  Labs Reviewed   TROPONIN - Abnormal; Notable for the following:        Result Value    Troponin I 0.11 (*)     All other components within normal limits    Narrative:     Indicate which anticoagulants the patient is on:->UNKNOWN   CBC WITH DIFFERENTIAL - Abnormal; Notable for the following:     Neutrophils-Polys 43.50 (*)     Lymphocytes 47.80 (*)     Lymphs (Absolute) 4.88 (*)     All other components within normal limits    Narrative:     Indicate which anticoagulants the patient is on:->UNKNOWN   COMP METABOLIC PANEL - Abnormal; Notable for the following:     Potassium 3.3 (*)     Glucose 140 (*)     All other components within normal limits    Narrative:     Indicate which anticoagulants the patient is on:->UNKNOWN   APTT - Abnormal; Notable for the following:     APTT 21.7 (*)     All other components within normal limits    Narrative:     Indicate which anticoagulants the patient is on:->UNKNOWN   ESTIMATED GFR - Abnormal; Notable for the following:     GFR If Non  58 (*)     All other components within normal limits    Narrative:     Indicate which anticoagulants the patient is on:->UNKNOWN   ACTIVATED CLOTTING TIME, ISTAT - Abnormal; Notable for the following:     Istat Activated Clotting Time 158 (*)     All other components within normal limits   ACTIVATED CLOTTING TIME, ISTAT - Abnormal; Notable for the following:     Istat Activated Clotting Time 180 (*)     All other components within normal limits   BTYPE NATRIURETIC PEPTIDE    Narrative:     Indicate  which anticoagulants the patient is on:->UNKNOWN   PROTHROMBIN TIME    Narrative:     Indicate which anticoagulants the patient is on:->UNKNOWN   LIPASE    Narrative:     Indicate which anticoagulants the patient is on:->UNKNOWN   DIFFERENTIAL MANUAL    Narrative:     Indicate which anticoagulants the patient is on:->UNKNOWN   PERIPHERAL SMEAR REVIEW    Narrative:     Indicate which anticoagulants the patient is on:->UNKNOWN   PLATELET ESTIMATE    Narrative:     Indicate which anticoagulants the patient is on:->UNKNOWN   MORPHOLOGY    Narrative:     Indicate which anticoagulants the patient is on:->UNKNOWN   BASIC METABOLIC PANEL   CBC WITHOUT DIFFERENTIAL   TROPONIN   LIPID PROFILE     All labs reviewed by me.    EKG  12 Lead EKG interpreted by me to show:  Normal sinus rhythm  Rate 71  Axis: Normal  Intervals: Normal  Normal T waves  ST elevation in leads 2, 3, and aVF with reciprocal changes. ST depression in leads 1 and aVL  My impression of this EKG: Acute inferoposterior infarct    RADIOLOGY  DX-CHEST-LIMITED (1 VIEW)   Final Result         1.  Bilateral basilar atelectasis, no focal infiltrate      Echocardiogram Comp W/O Cont    (Results Pending)     The radiologist's interpretation of all radiological studies have been reviewed by me.    COURSE & MEDICAL DECISION MAKING  Pertinent Labs & Imaging studies reviewed. (See chart for details)    Differential diagnoses include but are not limited to: STEMI    10:47 PM Ordered Dx-Chest, Troponin, EKG, CNP, CBC, CMP, Prothrombin time, APTT,  Lipase, Estimated GFR, Differential Manual, Peripheral Smear Review, Platelet Estimate, and Morphology to evaluate his symptoms. Dr. Diaz (Cardiology) was in the room alongside me and evaluated the EKG and will take the patient to the cath lab.     11:19 PM Consult with Dr. Ortiz, hospitalist who agrees to admit the patient. The patient's care was transferred at this time.       DISPOSITION:  Patient will be admitted to   Emily (Cardiology) in guarded condition.    FINAL IMPRESSION  1. ST elevation myocardial infarction involving left circumflex coronary artery (HCC)           This dictation has been created using voice recognition software and/or scribes. The accuracy of the dictation is limited by the abilities of the software and the expertise of the scribes. I expect there may be some errors of grammar and possibly content. I made every attempt to manually correct the errors within my dictation. However, errors related to voice recognition software and/or scribes may still exist and should be interpreted within the appropriate context.     Obed PARKER (Scribe), am scribing for, and in the presence of, Gretchen Tucker M.D..    Electronically signed by: Obed Luciano (Scribe), 9/5/2018    Gretchen PARKER M.D. personally performed the services described in this documentation, as scribed by Obed Luciano in my presence, and it is both accurate and complete.    The note accurately reflects work and decisions made by me.  Gretchen Tucker  9/6/2018  2:10 AM

## 2018-09-06 NOTE — PROGRESS NOTES
Bedside report received from night RN.  Pt assessed A&Ox4, no c/o pain, no sob noted.  POC discussed with pt, all questions answered.  Pt states all needs are met.  Bed alarm not on, pt self ambulatory and safe, Taylor Fall risk deems patient no risk, bed in lowest position, call light within reach.  Will continue to monitor

## 2018-09-06 NOTE — CARE PLAN
Problem: Safety  Goal: Will remain free from injury    Intervention: Provide assistance with mobility  Patient oriented to unit, instructed on use of call light, all questions answered. Wife at bedside      Problem: Venous Thromboembolism (VTW)/Deep Vein Thrombosis (DVT) Prevention:  Goal: Patient will participate in Venous Thrombosis (VTE)/Deep Vein Thrombosis (DVT)Prevention Measures    Intervention: Encourage patient to perform ankle flex, foot rotation, and knee flex exercises in addition to other prophylatic measures every hour while awake  Patient encouraged to move in bed and perform exercises while awake

## 2018-09-06 NOTE — CARE PLAN
Problem: Infection  Goal: Will remain free from infection  Family and patient educated on washing hands and using foam in and foam out.     Problem: Knowledge Deficit  Goal: Knowledge of disease process/condition, treatment plan, diagnostic tests, and medications will improve  POC discussed with patient, all questions answered, medication education completed, pt educated on disease process.

## 2018-09-06 NOTE — CATH LAB
Immediate Post-Operative Note      PreOp Diagnosis: STEMI    PostOp Diagnosis: STEMI    Procedure(s) :  Coronary Angiography, Left Heart Catheterization, Left Ventriculography.  3.25 x 23 mm Xience stent mid Circ.  Emergency cardioversion X1    Surgeon(s):  Jayesh Diaz M.D.    Type of Anesthesia: Moderate Sedation    Specimen: None    Estimated Blood Loss: 10 cc's    Contrast Media:  126 cc's    Fluoro Time: 5.3 min    Xray DAP: 44923    Findings: 100% mid Circ stented with good result.  Moderate stenosis in prox LAD and Mid RCA  EF 45% with distal inferior wall and apical akinesis    Complications: none.      Jayesh Diaz M.D.  9/6/2018 12:01 AM

## 2018-09-06 NOTE — H&P
Hospital Medicine History & Physical Note    Date of Service  9/6/2018    Primary Care Physician  Kalin Ash M.D.    Consultants  Cardiology, interventional cardiology    Code Status  Full code    Chief Complaint  Chest pain    History of Presenting Illness  53 y.o. male with no prior medical or surgical history, but with father with early myocardial infarction, was in his usual state of health until the day prior to this admission.  He reports that approximately 10 PM, shortly after some exertional activity, he began to have chest pressure in the middle of his chest, which is nonradiating, but associated with diaphoresis.  He reports no exacerbating or alleviating factors, and he is subsequently brought to the emergency department for further evaluation.  On arrival, he was noted to have ST elevations, he was taken to the catheterization lab.  He currently feels better, he has no significant chest pain, he denies any shortness of breath.  No other complaints.    Review of Systems  Review of Systems   Constitutional: Positive for diaphoresis.   HENT: Negative.    Eyes: Negative.    Respiratory: Negative.    Cardiovascular: Positive for chest pain.   Gastrointestinal: Negative.    Genitourinary: Negative.    Musculoskeletal: Negative.    Skin: Negative.    Neurological: Negative.    Endo/Heme/Allergies: Negative.    Psychiatric/Behavioral: Negative.        Past Medical History   has no past medical history on file.    Surgical History   has no past surgical history on file.     Family History  family history includes Heart Attack in his father; Hypertension in his father; No Known Problems in his mother.     Social History   reports that he has never smoked. He has never used smokeless tobacco. He reports that he does not drink alcohol or use drugs.    Allergies  No Known Allergies    Medications  None       Physical Exam      Temperature: 35.9 °C (96.6 °F)   Pulse: 78   Respiration: 18   Pulse Oximetry: 92 %      Physical Exam   Constitutional: He is oriented to person, place, and time. He appears well-developed and well-nourished. No distress.   HENT:   Head: Normocephalic and atraumatic.   Eyes: Pupils are equal, round, and reactive to light. Conjunctivae are normal.   Neck: Normal range of motion. Neck supple. No tracheal deviation present. No thyromegaly present.   Cardiovascular: Normal rate, regular rhythm and normal heart sounds.  Exam reveals no gallop and no friction rub.    No murmur heard.  Pulmonary/Chest: Effort normal and breath sounds normal. No respiratory distress. He has no wheezes.   Abdominal: Soft. Bowel sounds are normal. He exhibits no distension and no mass. There is no tenderness. There is no rebound and no guarding.   Musculoskeletal: Normal range of motion. He exhibits no edema.   Lymphadenopathy:     He has no cervical adenopathy.   Neurological: He is alert and oriented to person, place, and time. No cranial nerve deficit.   Skin: Skin is warm and dry. He is not diaphoretic.   Psychiatric: He has a normal mood and affect.   Nursing note and vitals reviewed.      Laboratory:  Recent Labs      09/05/18 2248   WBC  10.2   RBC  5.39   HEMOGLOBIN  15.1   HEMATOCRIT  43.9   MCV  81.4   MCH  28.0   MCHC  34.4   RDW  36.5   PLATELETCT  246   MPV  9.9     Recent Labs      09/05/18 2248   SODIUM  141   POTASSIUM  3.3*   CHLORIDE  108   CO2  25   GLUCOSE  140*   BUN  20   CREATININE  1.29   CALCIUM  9.5     Recent Labs      09/05/18 2248   ALTSGPT  20   ASTSGOT  22   ALKPHOSPHAT  75   TBILIRUBIN  0.4   LIPASE  26   GLUCOSE  140*     Recent Labs      09/05/18 2248   APTT  21.7*   INR  1.04     Recent Labs      09/05/18 2248   BNPBTYPENAT  11         Lab Results   Component Value Date    TROPONINI 0.11 (H) 09/05/2018       Urinalysis:    No results found     Imaging:  DX-CHEST-LIMITED (1 VIEW)   Final Result         1.  Bilateral basilar atelectasis, no focal infiltrate      Echocardiogram Comp  W/O Cont    (Results Pending)         Assessment/Plan:  I anticipate this patient will require at least two midnights for appropriate medical management, necessitating inpatient admission.    * STEMI (ST elevation myocardial infarction) (HCC)   Assessment & Plan    100% occlusion mid circ status post PCI.  Continue post procedure monitoring, medications as per cardiology.         Overweight   Assessment & Plan    Body mass index is 28.19 kg/m².          CAD (coronary artery disease)   Assessment & Plan    Mid circ status post PCI, also some occlusive disease Prox LAD, Mid RCA.             VTE prophylaxis: SCD

## 2018-09-06 NOTE — PROGRESS NOTES
.Cardiovascular Nurse Navigator ([id number]) Note:    Reviewed ACS medications, no heart failure diagnosis:  DAPT: aspirin + prasugrel  Beta-Blocker:  carvedilol  Statin:  rosuvastatin 40mg    Consider for aldosterone blockade?  No -- EF is 45% per angio on 9/6/18  Consider for ACE-I/ARB/ARNI? losartan    Intensive Cardiac Rehab (ICR) Referral:  Referred on 9/5; has current inpatient orders for nutrition consult & PT for Phase I ICR    Demographics  Patient resides in: [place]  Insurance: [facility]    Inpatient & Discharge Patient Education:  Bedside nursing to continually provide patient education on ACS meds, signs and symptoms to monitor for, and risk factor modification.     Also at discharge please complete the “Acute Coronary Syndrome” special instructions on the AVS.          Your appointments     Oct 08, 2018  1:40 PM [facility]  Hospital Follow up with JAGDISH Solis  [facility] (--) [address]  [facility] [address] [phone]      Follow-Up       Follow-up With  Details  Why  Contact Info   [facility]  Call on 10/8/2018  You have been referred to Intensive Cardiac Rehab but cannot begin for 6 weeks to allow time for your heart to heal. If you do not hear from ICR in about 5 weeks, please call them to schedule. Thank you!  [address]  [address]  [facility] [address] [phone]

## 2018-09-06 NOTE — PROGRESS NOTES
Patient seen and examined today. ICU Care  Care and plan discussed in IDT/Hot rounds.  Lines and assistive devices reviewed.    Patient tolerating treatment and therapies.  All Data, Medication data reviewed.  Case discussed with nursing as available.  Plan of Care reviewed with patient and notified of changes.  Stabilized after PCI  Okay to transfer to floor  Full follow-up in a.m.

## 2018-09-06 NOTE — ASSESSMENT & PLAN NOTE
100% occlusion mid circ status post PCI.  Continue post procedure monitoring, medications as per cardiology.

## 2018-09-06 NOTE — CONSULTS
DATE OF SERVICE:  09/05/2018    CHIEF COMPLAINT:  Chest pain.    HISTORY OF PRESENT ILLNESS:  The patient was brought in by paramedics as a   code STEMI.  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  He was   intervening with a destructive patient when he developed midsternal chest   discomfort that he rated 10/10 in severity.  No associated nausea or   sweatiness.  Paramedics were summoned and EKG showed subtle inferior ST   segment elevation and anterior depression.  He was given nitroglycerin en   route.  Currently, his pain is approximately 4/10 in severity.    There is no antecedent history of known coronary artery disease or prior bouts   of chest pain.  Her cardiac risk factor profile is positive for family   history of premature coronary artery disease and unknown lipid status.  He   denies hypertension, smoking, diabetes or known lipid problems.    PAST SURGICAL HISTORY:  None.    ALLERGIES:  None.    ILLNESSES:  History of kidney stones.    FAMILY HISTORY:  Father had a heart attack at age 50.  Mother has   hypertension.    REVIEW OF SYSTEMS:  CONSTITUTIONAL:  Notable for good health.  Physically, he denies any asthma.  GASTROINTESTINAL:  Denies any GI bleeding or melena.  RENAL:  Denies any kidney disease or hematuria.  All others reviewed and negative.    PHYSICAL EXAMINATION:  GENERAL:  Patient is in mild distress with chest pain.  VITAL SIGNS:  On the monitor, he is in sinus rhythm at 71, without ectopy.    Blood pressure 146 systolic.  HEENT:  Pupils are equal, sclerae nonicteric, gaze conjugate.  Mallampati   score is 4.  NECK:  No JVD, no bruit.  BACK:  Without deformity.  LUNGS:  Clear to auscultation.  HEART:  Regular rate and rhythm without S3, without murmur.  ABDOMEN:  Soft and nontender, no protuberant.  No masses, no pain to   palpation.  EXTREMITIES:  Posterior tibial pulses are 2+, radial pulses are 2+.    Musculature is normal.  There is no edema.  SKIN:  Warm and dry.  NEUROLOGIC:   Patient is alert and cooperative.  No facial droop.    LABORATORY DATA:  Laboratory is pending.    EKG demonstrates sinus rhythm with normal AV conduction.  There is ST   elevation, particularly in lead aVF and III and less pronounced in II.    Significant ST segment depression, particularly in V2.    IMPRESSION:  1.  Acute inferior wall myocardial infarction.  The patient presents with   chest pain and has evidence of an ST segment elevation MI by EKG.  Risk factor   profile is positive only family history and unknown lipid status.  He was   taken emergently to the cath lab for coronary angiography and possible   intervention.  The cardiac catheterization procedure explained to the patient   and his wife.  The inherent risks of conscious sedation and need for CABG were   discussed.  2.  History of kidney stones.  3.  Family history of premature coronary artery disease.  4.  Unknown lipid status.       ____________________________________     GABI GOETZ MD    RPS / NTS    DD:  09/05/2018 22:59:38  DT:  09/06/2018 03:32:05    D#:  [id number]  Job#:  [id number]

## 2018-09-06 NOTE — PROGRESS NOTES
2 RN skin check completed with Nasima GROSS. Skin intact, no areas of redness on bony prominences, no wounds. R radial cath site with hemoband in place.

## 2018-09-06 NOTE — PROCEDURES
DATE OF SERVICE:  09/05/2018    PROCEDURES:  1.  Supervision of conscious sedation.  2.  Emergency cardioversion.  3.  Selective coronary angiography.  4.  Left heart catheterization.  5.  Left ventriculography.  6.  Placement of 3.25x23 mm Xience Nila stent in the mid circumflex.    INDICATIONS:  Patient is a 53-year-old gentleman brought in by paramedics with   chest pain as a code STEMI.  EKG showed evidence of an acute inferior wall MI   and he was taken emergently to the cath lab for coronary angiography and   intervention.    DESCRIPTION OF PROCEDURE:  The right wrist was sterilely prepped and draped in   the usual fashion and the area over the right radial artery was anesthetized   with 2% lidocaine.  He received 50 mcg of fentanyl in the ER of my direction   and was given 1.5 mg of Versed in the cath lab.  He was given an additional   Versed during the procedure.  Please refer to the nurses' notes for dosages   and timing.    The right radial artery was easily entered and a 6-Liberian sheath was placed.    He was given 100 mcg of intra-arterial nitroglycerin, 2.5 mg of intra-arterial   verapamil and 4000 units of intravenous heparin.  A 5-Liberian Den catheter   was placed and advanced into the ascending aorta.  At this point, patient had   ventricular fibrillation and was cardioverted once at 150 joules with   resultant sinus rhythm.  The catheter was then manipulated into the ostium of   the left main coronary artery where selective angiograms were performed.  The   same catheter was then manipulated into the ostium of the right coronary   artery where selective angiograms were again performed.  Following this, a   pigtail catheter was exchanged and left heart catheterization was performed.    This was followed by left ventriculogram using 24 mL of contrast over 3   seconds.  A left heart pullback was performed.    A coronary intervention was performed.  An ACT was checked and the patient was   given  additional intravenous heparin.  An EBU guide was placed and BMW wire   was placed across the lesion and occluded circumflex.  This was angioplastied   with a 3.0 mm balloon at 8 atmospheres and then 10 atmospheres.  Following   this, a 3.25x23 mm Xience Nila stent was placed and deployed at 12   atmospheres and reinflated at 14 atmospheres with good result.  The patient   was noted be in atrial fibrillation after his cardioversion.  He was   transiently hypotensive and started on norepinephrine.  He had gradual   improvement in his blood pressure to the point that the norepinephrine stopped   by the time he was taken off the cath lab table.    The patient was given initial 60 mcg of nitroglycerin through the arterial   sheath.  The sheath was then removed and hemostasis obtained by direct   compression of the artery with Hemoband.    ESTIMATED BLOOD LOSS:  10 mL.    FLUOROSCOPY TIME:  5.3 minutes.    X-RAY DOSE-AREA PRODUCT:  14,445.    TOTAL CONTRAST MEDIA:  126 mL of Omnipaque 350.  There were no complications.    Conscious sedation supervision start time 11:08 p.m., completed 12:03 a.m.    HEMODYNAMICS:  Revealed initially sinus rhythm, after cardioversion, he was in   atrial fibrillation as described above.  Left ventricular pressure 93/25   mmHg, LV pressure 100/73 mmHg.    Fluoroscopy of the heart is unremarkable.    The left main is free of disease and bifurcates into the LAD and circumflex.    The circumflex gives rise to 2 small proximal marginal arteries.  The   midcircumflex arises a slightly larger third marginal artery that has a 75%   lesion at its origin.  Immediately beyond the origin of this marginal artery,   the circumflex was completely occluded with KYM 0 flow.  Post-intervention,   there was restoration of flow through entire circumflex.  The small third   marginal artery is occluded as it was jailed by the stent.  Beyond the stented   segment, the circumflex gives rise to 2 large distal  "marginal arteries with   KYM-3 flow to the terminus of each and no evidence of any residual thrombus.    The stented segment shows no residual stenosis with a slight step up through   the stented segment.    The LAD terminates at the LV apex and is congenitally a small vessel.  The LAD   gives rise to 4 small caliber diagonal arteries.  The proximal LAD has   elongated area of stenosis measuring 40%.  Beyond the first diagonal artery,   is another elongated area of stenosis that is 50% in severity.    The right coronary artery is a dominant vessel distally giving rise to a   moderate sized PDA and moderate sized bifurcating posterior left ventricular   artery.    The proximal circumflex has an eccentric 40% stenosis.  There were no flow   limiting lesions in the right coronary artery.    The left ventriculogram demonstrates akinesis of the distal inferior wall and   apex of the left ventricle.  The ejection fraction is 45%.    IMPRESSION:  1.  Successful emergency angioplasty and stenting of occluded mid circumflex   with 3.25x23 mm Xience Nila stent.  2.  Elongated 40% lesion in the proximal left anterior descending.  3.  Elongated 50% lesion in the left anterior descending between the first and   second diagonal arteries.  4.  Eccentric 40% lesion in the proximal right coronary artery.  5.  Occlusion of a small \"jailed\" flow marginal artery.  6.  Left ventricular dysfunction with ejection fraction of 45%.  7.  Successful emergency cardioversion for ventricular fibrillation.       ____________________________________     MD EARL CARABALLO / NTS    DD:  09/06/2018 06:22:52  DT:  09/06/2018 06:47:56    D#:  [id number]  Job#:  [id number]  "

## 2018-09-06 NOTE — PROGRESS NOTES
Lab called with critical result of Troponin at 0658. Critical lab result read back to Lab.   This critical lab result is within parameters established by  for this patient

## 2018-09-06 NOTE — PROGRESS NOTES
Cardiology Progress Note               Author: Karsten To Date & Time created: 9/6/2018  11:22 AM     Interval History:  No telemetry events.  Chest pain resolved.    PCI of mid Circ last night.  Residual disease in LAD, diag and RCA (non-obstructive).    Chief Complaint:  Chest pain.    Review of Systems   Constitutional: Negative for chills, fever, malaise/fatigue and weight loss.   HENT: Negative for ear discharge, ear pain, hearing loss and nosebleeds.    Eyes: Negative for blurred vision, double vision, pain and discharge.   Respiratory: Negative for cough and shortness of breath.    Cardiovascular: Negative for chest pain, palpitations, orthopnea, claudication, leg swelling and PND.   Gastrointestinal: Negative for abdominal pain, blood in stool, melena, nausea and vomiting.   Genitourinary: Negative for dysuria and hematuria.   Musculoskeletal: Negative for falls, joint pain and myalgias.   Skin: Negative for itching and rash.   Neurological: Negative for dizziness, sensory change, speech change, loss of consciousness and headaches.   Endo/Heme/Allergies: Negative for environmental allergies. Does not bruise/bleed easily.   Psychiatric/Behavioral: Negative for depression, hallucinations and suicidal ideas.       Physical Exam   Constitutional: He is oriented to person, place, and time. He appears well-developed and well-nourished.   HENT:   Head: Normocephalic and atraumatic.   Eyes: EOM are normal.   Neck: Normal range of motion. No JVD present.   Cardiovascular: Normal rate, regular rhythm, normal heart sounds and intact distal pulses.  Exam reveals no gallop and no friction rub.    No murmur heard.  Bilateral femoral pulses are 2+, bilateral dorsalis pedis pulses are 2+, bilateral posterior tibialis pulses are 2+.   Pulmonary/Chest: No respiratory distress. He has no wheezes. He has no rales. He exhibits no tenderness.   Abdominal: Soft. Bowel sounds are normal. There is no tenderness. There is no  rebound and no guarding.   The is no presence of abdominal bruits   Musculoskeletal: Normal range of motion.   Neurological: He is alert and oriented to person, place, and time.   Skin: Skin is warm and dry.   Psychiatric: He has a normal mood and affect.   Nursing note and vitals reviewed.      Hemodynamics:  Temp (24hrs), Av °C (96.8 °F), Min:35.9 °C (96.6 °F), Max:36.3 °C (97.4 °F)  Temperature: 36.3 °C (97.4 °F)  Pulse  Av.7  Min: 56  Max: 124Heart Rate (Monitored): (!) 57  Blood Pressure: 102/68, NIBP: 112/69     Respiratory:    Respiration: 17, Pulse Oximetry: 96 %           Fluids:  Date 18 0700 - 18 0659   Shift 8596-6028 7552-1534 9091-7441 24 Hour Total   I  N  T  A  K  E   I.V. 118.5   118.5    Shift Total 118.5   118.5   O  U  T  P  U  T   Urine 450   450    Shift Total 450   450   Weight (kg) 84.1 84.1 84.1 84.1       Weight: 84.1 kg (185 lb 6.5 oz)  GI/Nutrition:  Orders Placed This Encounter   Procedures   • Diet Order Cardiac (No Sausage or Pork)     Standing Status:   Standing     Number of Occurrences:   1     Order Specific Question:   Diet:     Answer:   Cardiac [6]     Comments:   No Sausage or Pork     Lab Results:  Recent Labs      18   0535   WBC  10.2  10.3   RBC  5.39  4.82   HEMOGLOBIN  15.1  13.5*   HEMATOCRIT  43.9  38.9*   MCV  81.4  80.7*   MCH  28.0  28.0   MCHC  34.4  34.7   RDW  36.5  36.0   PLATELETCT  246  201   MPV  9.9  9.9     Recent Labs      18   0535   SODIUM  141  138   POTASSIUM  3.3*  4.0   CHLORIDE  108  111   CO2  25  21   GLUCOSE  140*  128*   BUN  20  18   CREATININE  1.29  1.03   CALCIUM  9.5  8.1*     Recent Labs      18   APTT  21.7*   INR  1.04     Recent Labs      09/05/18   2248   BNPBTYPENAT  11     Recent Labs      18   2248  18   0535   TROPONINI  0.11*  39.50*   BNPBTYPENAT  11   --      Recent Labs      18   0535   TRIGLYCERIDE  90   HDL  49   LDL  63          Medical Decision Making, by Problem:  Active Hospital Problems    Diagnosis   • *STEMI (ST elevation myocardial infarction) (HCC) [I21.3]   • CAD (coronary artery disease) [I25.10]   • Overweight [E66.3]   • High risk medication use [Z79.899]       Plan:  Continue Prasugrel, asa  Will start coreg 6.25 mg po bid (LVEF of 45% on LV gram).  Will increase Rosuvastatin to 40 mg po daily.  Will start Losartan 25 mg po daily.  Consider Spironolactone in future after BB and ARB are optimized.    Thank you for referring this patient to our cardiology service.  We will follow patient with you.    Karsten Dorado MD.  [facility].        Quality-Core Measures   Reviewed items::  EKG reviewed, Labs reviewed, Medications reviewed and Radiology images reviewed

## 2018-09-06 NOTE — PROGRESS NOTES
Report received from cath lab RN. VSS, patient resting comfortably, all questions answered. Patient connected to CIC monitoring.

## 2018-09-06 NOTE — CONSULTS
Critical Care/Pulmonary Consultation    Date of Service: 9/6/2018    Date of Admission:  9/5/2018 10:45 PM    Consulting Physician: No att. providers found    Chief Complaint:  No chief complaint on file.      History of Present Illness: Jayson Mccurdy is a 53 y.o. Male with no significant pmh presented with chest pain at 10 pm with exertion, middle chest, non radiating.  In ER had ST elevation.  Per cath report: 100% mid Circ stented with good result.  Moderate stenosis in prox LAD and Mid RCA.  EF 45% with distal inferior wall and apical akinesis. Patient denies any sob or chest pain currently.  No headache or abdominal pain or lower ext edema.  Says family hx + with brothers and father with early MI < 51 yo.  He has no medications at home, no prior heart disease or respiratory disease such as asthma or copd.  Smokes cigarettes, > 20 years.  Typically active in daily life.  No current nausea or vomiting.  On amiodarone drip for afib rvr during/post cardiac cath overnight.    ROS  General: no wt changes, no night sweats  Head, no lesions, no headache  Eyes: jaundice, no vision changes or loss of vision, no eye pain  Ears: no hearing changes, no tinnitus  Neck: no neck pain, no reduced rom, no lymphadenopathy  Throat: no sore throat, no difficulty swallowing  Cardiac: see hpi  Respiratory: no cough, no sputum production, no sob, no hemoptysis  GI: no diarrhea, hematemasis, blood per rectum, constipation, no abdominal pain  : no dysuria or polyuria  Neuro: no acute motor/sensory changes, no right or left side weakness, no dizziness  MSK: no joint pain, no joint swelling  Heme/onc: no lymphadenopathy or masses, no bruising  Skin: no rashes, no erythema  All other systems reviewed and negative        Home Medications    **Home medications have not yet been reviewed for this encounter**         Social History   Substance Use Topics   • Smoking status: Never Smoker   • Smokeless tobacco: Never Used   • Alcohol  "use No        History reviewed. No pertinent past medical history.    History reviewed. No pertinent surgical history.    Allergies: Patient has no known allergies.    Family History   Problem Relation Age of Onset   • No Known Problems Mother    • Heart Attack Father    • Hypertension Father      Brother MI in late 30s  Vitals:    09/05/18 2200 09/06/18 0020 09/06/18 0030 09/06/18 0045   Height: 1.727 m (5' 8\")      Weight: 79.4 kg (175 lb) 84.1 kg (185 lb 6.5 oz)     Weight % change since last entry.: 0 % 5.95 %     BP:       Pulse:  85 98 (!) 117   BMI (Calculated): 26.61      Resp:  17 16 (!) 25   Temp:  35.9 °C (96.6 °F)      09/06/18 0100 09/06/18 0115 09/06/18 0130 09/06/18 0145   Height:       Weight:       Weight % change since last entry.:       BP:       Pulse: (!) 124 (!) 103 (!) 107 79   BMI (Calculated):       Resp: (!) 22 18 19 (!) 24   Temp:        09/06/18 0200 09/06/18 0230 09/06/18 0300 09/06/18 0330   Height:       Weight:       Weight % change since last entry.:       BP:       Pulse: 78 82 78 79   BMI (Calculated):       Resp: 18 15 19 19   Temp:        09/06/18 0400 09/06/18 0423 09/06/18 0424 09/06/18 0430   Height:  1.727 m (5' 7.99\") 1.727 m (5' 8\")    Weight:  84.1 kg (185 lb 6.5 oz) 84.1 kg (185 lb 6.5 oz)    Weight % change since last entry.:  0 % 0 %    BP:   102/68    Pulse: 61  61 61   BMI (Calculated):  28.2 28.19    Resp: 14  14 16   Temp:  35.9 °C (96.6 °F) 35.9 °C (96.6 °F)     09/06/18 0500 09/06/18 0530 09/06/18 0600   Height:      Weight:      Weight % change since last entry.:      BP:      Pulse: (!) 59 65 61   BMI (Calculated):      Resp: 13 16 20   Temp:        Physical exam:    General: nad, resting comfortably  Neuro/Psych: cn 2-12 intact, no focal motor or sensory changes, no vision changes, reflexes intact, normal mood and affect  HEENT: nc/at, no jvd, no lymphadenopathy, oral mucosa moist, eomi+, perrla, no tracheal deviation  CVS: s1, s2 heard, neg murmur noted, " rrr  Respiratory: cta, greg, no wheezing or rhonchi, adequate breath sounds  Abdomen/: soft, nt, neg hsm/m, no guarding, no rigidity  Extremities: no edema or erythema noted, adequate pulses ext and perfusion  Skin: no rashes or bruising noted      Intake/Output Summary (Last 24 hours) at 09/06/18 0649  Last data filed at 09/06/18 0600   Gross per 24 hour   Intake           1656.3 ml   Output              400 ml   Net           1256.3 ml       Recent Labs      09/05/18 2248  09/06/18   0535   WBC  10.2  10.3   NEUTSPOLYS  43.50*   --    LYMPHOCYTES  47.80*   --    MONOCYTES  7.00   --    EOSINOPHILS  1.70   --    BASOPHILS  0.00   --    ASTSGOT  22   --    ALTSGPT  20   --    ALKPHOSPHAT  75   --    TBILIRUBIN  0.4   --      Recent Labs      09/05/18   2248   SODIUM  141   POTASSIUM  3.3*   CHLORIDE  108   CO2  25   BUN  20   CREATININE  1.29   CALCIUM  9.5     Recent Labs      09/05/18 2248   ALTSGPT  20   ASTSGOT  22   ALKPHOSPHAT  75   TBILIRUBIN  0.4   LIPASE  26   GLUCOSE  140*     Labs reviewed per myeself        Invalid input(s): [id number]  DX-CHEST-LIMITED (1 VIEW)   Final Result         1.  Bilateral basilar atelectasis, no focal infiltrate      Echocardiogram Comp W/O Cont    (Results Pending)     Reviewed chest xray, no focal infiltrates, no significant congestion    Patient Active Problem List   Diagnosis   • STEMI (ST elevation myocardial infarction) (HCC)   • CAD (coronary artery disease)   • Overweight       Assessment and Plan:  CAD with STEMI, post stent to circumflex that was 100% occluded.  ASA, carvedilol, losartan, effient, statin.    Acute systolic heart failure, ECHO 45% EF with hypokinesis, related to MI/ischemia.  On losartan, other meds as above.  Currently not hypervolemic, lasix not currently necessary.    Atrial fibrillation with RVR, during/after procedure, managing the amiodarone drip, will d/c once finished current dose, in next 2 hours as long as no further episodes.  For  now no need for anticoagulation, not seen initially and likely due to procedure and MI    Tobacco use, discussed cessation, patient plans to quit, nicotine patch if necessary, for now patient defers. No significant wheezing, duonebs prn per respiratory protocol.    Hypokalemia, replete for K > 4, Mg > 2, ordered mg.    Troponin elevation    GI px, not necessary  DVT px, ambulatory  Diet as tolerated, if unable to tolerate will cont tube feeds, ng in place  No current need for physical therapy, ambulatory  No central line  No current antibiotics    Willie Gonzalez MD  [facility]    Patient is critically ill. If untreated there is a high chance of deterioration and eventually death. The critical that has been undertaken is medically complex. Managing amiodarone drip.  There has been no overlap in critical care time. Critical Care Time not including procedures: 37 minutes

## 2018-09-06 NOTE — PROGRESS NOTES
Monitor summary: Patient was in A-fib rates 70-120s until 0345 when he converted to SR. Rates in SR 60-80s with occasional PACs and rare PVCs. --/0.08/--

## 2018-09-06 NOTE — DISCHARGE PLANNING
Referral: STEMI    Intervention: Pt arrived via [facility] as a STEMI. Pt wife Yessy [phone] arrived to ED waiting room. SW met with wife. Updated on Medical condition and was able to bring to Pt bedside to speak with ERP and Cardiologist. Pt was taken to CATH lab and wife taken to CATH lab waiting room. Support given.    Plan: SW available for needs.

## 2018-09-07 VITALS
DIASTOLIC BLOOD PRESSURE: 68 MMHG | HEIGHT: 68 IN | OXYGEN SATURATION: 97 % | WEIGHT: 177.47 LBS | HEART RATE: 62 BPM | TEMPERATURE: 99.2 F | RESPIRATION RATE: 20 BRPM | BODY MASS INDEX: 26.9 KG/M2 | SYSTOLIC BLOOD PRESSURE: 104 MMHG

## 2018-09-07 PROBLEM — N28.9 ACUTE RENAL INSUFFICIENCY: Status: ACTIVE | Noted: 2018-09-07

## 2018-09-07 PROBLEM — R79.89 TSH ELEVATION: Status: ACTIVE | Noted: 2018-09-07

## 2018-09-07 LAB
ANION GAP SERPL CALC-SCNC: 7 MMOL/L (ref 0–11.9)
BASOPHILS # BLD AUTO: 0.1 % (ref 0–1.8)
BASOPHILS # BLD: 0.01 K/UL (ref 0–0.12)
BUN SERPL-MCNC: 10 MG/DL (ref 8–22)
CALCIUM SERPL-MCNC: 9.5 MG/DL (ref 8.5–10.5)
CHLORIDE SERPL-SCNC: 107 MMOL/L (ref 96–112)
CO2 SERPL-SCNC: 24 MMOL/L (ref 20–33)
CREAT SERPL-MCNC: 0.98 MG/DL (ref 0.5–1.4)
EOSINOPHIL # BLD AUTO: 0.06 K/UL (ref 0–0.51)
EOSINOPHIL NFR BLD: 0.7 % (ref 0–6.9)
ERYTHROCYTE [DISTWIDTH] IN BLOOD BY AUTOMATED COUNT: 37.8 FL (ref 35.9–50)
EST. AVERAGE GLUCOSE BLD GHB EST-MCNC: 120 MG/DL
GLUCOSE SERPL-MCNC: 104 MG/DL (ref 65–99)
HBA1C MFR BLD: 5.8 % (ref 0–5.6)
HCT VFR BLD AUTO: 45.3 % (ref 42–52)
HGB BLD-MCNC: 15.5 G/DL (ref 14–18)
IMM GRANULOCYTES # BLD AUTO: 0.02 K/UL (ref 0–0.11)
IMM GRANULOCYTES NFR BLD AUTO: 0.2 % (ref 0–0.9)
LV EJECT FRACT  99904: 55
LV EJECT FRACT MOD 2C 99903: 56.95
LV EJECT FRACT MOD 4C 99902: 51.84
LV EJECT FRACT MOD BP 99901: 57.1
LYMPHOCYTES # BLD AUTO: 2.67 K/UL (ref 1–4.8)
LYMPHOCYTES NFR BLD: 30.5 % (ref 22–41)
MCH RBC QN AUTO: 28.4 PG (ref 27–33)
MCHC RBC AUTO-ENTMCNC: 34.2 G/DL (ref 33.7–35.3)
MCV RBC AUTO: 83 FL (ref 81.4–97.8)
MONOCYTES # BLD AUTO: 0.57 K/UL (ref 0–0.85)
MONOCYTES NFR BLD AUTO: 6.5 % (ref 0–13.4)
NEUTROPHILS # BLD AUTO: 5.42 K/UL (ref 1.82–7.42)
NEUTROPHILS NFR BLD: 62 % (ref 44–72)
NRBC # BLD AUTO: 0 K/UL
NRBC BLD-RTO: 0 /100 WBC
PLATELET # BLD AUTO: 206 K/UL (ref 164–446)
PMV BLD AUTO: 10 FL (ref 9–12.9)
POTASSIUM SERPL-SCNC: 3.8 MMOL/L (ref 3.6–5.5)
RBC # BLD AUTO: 5.46 M/UL (ref 4.7–6.1)
SODIUM SERPL-SCNC: 138 MMOL/L (ref 135–145)
TSH SERPL DL<=0.005 MIU/L-ACNC: 9.45 UIU/ML (ref 0.38–5.33)
WBC # BLD AUTO: 8.8 K/UL (ref 4.8–10.8)

## 2018-09-07 PROCEDURE — 83036 HEMOGLOBIN GLYCOSYLATED A1C: CPT

## 2018-09-07 PROCEDURE — 84443 ASSAY THYROID STIM HORMONE: CPT

## 2018-09-07 PROCEDURE — A9270 NON-COVERED ITEM OR SERVICE: HCPCS | Performed by: INTERNAL MEDICINE

## 2018-09-07 PROCEDURE — 80048 BASIC METABOLIC PNL TOTAL CA: CPT

## 2018-09-07 PROCEDURE — 93306 TTE W/DOPPLER COMPLETE: CPT | Mod: 26 | Performed by: INTERNAL MEDICINE

## 2018-09-07 PROCEDURE — 700102 HCHG RX REV CODE 250 W/ 637 OVERRIDE(OP): Performed by: INTERNAL MEDICINE

## 2018-09-07 PROCEDURE — 99239 HOSP IP/OBS DSCHRG MGMT >30: CPT | Performed by: FAMILY MEDICINE

## 2018-09-07 PROCEDURE — 36415 COLL VENOUS BLD VENIPUNCTURE: CPT

## 2018-09-07 PROCEDURE — 93306 TTE W/DOPPLER COMPLETE: CPT

## 2018-09-07 PROCEDURE — 85025 COMPLETE CBC W/AUTO DIFF WBC: CPT

## 2018-09-07 RX ORDER — ASPIRIN 81 MG/1
81 TABLET ORAL DAILY
Qty: 100 TAB | Refills: 1 | Status: SHIPPED | OUTPATIENT
Start: 2018-09-08

## 2018-09-07 RX ORDER — PRASUGREL 10 MG/1
10 TABLET, FILM COATED ORAL DAILY
Qty: 30 TAB | Refills: 11 | Status: SHIPPED | OUTPATIENT
Start: 2018-09-08 | End: 2018-09-18 | Stop reason: SDUPTHER

## 2018-09-07 RX ORDER — ROSUVASTATIN CALCIUM 40 MG/1
40 TABLET, COATED ORAL EVERY EVENING
Qty: 30 TAB | Refills: 1 | Status: SHIPPED | OUTPATIENT
Start: 2018-09-07 | End: 2018-09-18 | Stop reason: SDUPTHER

## 2018-09-07 RX ORDER — CARVEDILOL 6.25 MG/1
6.25 TABLET ORAL 2 TIMES DAILY WITH MEALS
Qty: 60 TAB | Refills: 1 | Status: SHIPPED | OUTPATIENT
Start: 2018-09-07 | End: 2018-09-18 | Stop reason: SDUPTHER

## 2018-09-07 RX ORDER — LOSARTAN POTASSIUM 25 MG/1
25 TABLET ORAL DAILY
Qty: 30 TAB | Refills: 1 | Status: SHIPPED | OUTPATIENT
Start: 2018-09-08 | End: 2018-09-18 | Stop reason: SDUPTHER

## 2018-09-07 RX ADMIN — CARVEDILOL 6.25 MG: 6.25 TABLET, FILM COATED ORAL at 09:39

## 2018-09-07 RX ADMIN — PRASUGREL 10 MG: 10 TABLET, FILM COATED ORAL at 05:00

## 2018-09-07 RX ADMIN — LOSARTAN POTASSIUM 25 MG: 25 TABLET, FILM COATED ORAL at 04:51

## 2018-09-07 RX ADMIN — ASPIRIN 81 MG: 81 TABLET, DELAYED RELEASE ORAL at 04:50

## 2018-09-07 ASSESSMENT — ENCOUNTER SYMPTOMS
COUGH: 0
PALPITATIONS: 0
PND: 0
SHORTNESS OF BREATH: 0
ORTHOPNEA: 0
CLAUDICATION: 0

## 2018-09-07 ASSESSMENT — PAIN SCALES - GENERAL
PAINLEVEL_OUTOF10: 0

## 2018-09-07 NOTE — PROGRESS NOTES
Received report from night shift RN, Radha. Discussed plan of care, updated white board. Pt is resting in bed, alert and oriented x4. Pt denies any pain or distress at this time. R radial site noted, clean dry and intact. Fall precautions in place. All needs met. Bed in lowest position. Call light within reach. Will continue to monitor.

## 2018-09-07 NOTE — CARE PLAN
Problem: Communication  Goal: The ability to communicate needs accurately and effectively will improve  Outcome: PROGRESSING AS EXPECTED  Patient respiratory status assessed. Patient educated on importance of coughing and deep breathing. Deep breaths are encouraged. Educated on how ambulation and movement can affect respiratory status. Patient indicates understanding.      Problem: Safety  Goal: Will remain free from falls  Outcome: PROGRESSING AS EXPECTED  Assessed fall risk, fall precautions initiated. Educated patient on use of call light, no slip socks on, bed lowest position. All needs attended to. Patient verbalized understanding. Pt calls appropriately.

## 2018-09-07 NOTE — PROGRESS NOTES
Received report from RENATO Hernandez. Assumed care of patient at this time. Patient in bed, tele monitor on. RA. Denies pain. No skin issues. A&Ox4. Up ad jean claude. Significant other present at bedside. Fall education complete. Bed low and locked, personal items and call bell within reach.

## 2018-09-07 NOTE — DISCHARGE PLANNING
Anticipated Discharge Disposition: home     Action: LSW informed Pt is requiring RX prasugrel (EFFIENT) 10 MG Tab, RX was sent to [facility] pharmacy, per request of cardiology APRN. LSW placed call regarding RX pricing, LSW informed RX is 50.83, information provided to Pt     Barriers to Discharge: none     Plan: Pt to  RX at [facility]

## 2018-09-07 NOTE — DISCHARGE INSTRUCTIONS
Discharge Instructions    Discharged to home by car with relative. Discharged via wheelchair, hospital escort: Yes.  Special equipment needed: Not Applicable    Be sure to schedule a follow-up appointment with your primary care doctor or any specialists as instructed.     Discharge Plan:   Diet Plan: Discussed  Activity Level: Discussed  Confirmed Follow up Appointment: Appointment Scheduled  Confirmed Symptoms Management: Discussed  Medication Reconciliation Updated: Yes  Influenza Vaccine Indication: Not indicated: Previously immunized this influenza season and > 8 years of age    I understand that a diet low in cholesterol, fat, and sodium is recommended for good health. Unless I have been given specific instructions below for another diet, I accept this instruction as my diet prescription.   Other diet: Cardiac    Special Instructions: Diagnosis:  Acute Coronary Syndrome (ACS) is a diagnosis that encompasses cardiac-related chest pain and heart attack. ACS occurs when the blood flow to the heart muscle is severely reduced or cut off completely due to a slow process called atherosclerosis.  Atherosclerosis is a disease in which the coronary arteries become narrow from a buildup of fat, cholesterol, and other substances that combine to form plaque. If the plaque breaks, a blood clot will form and block the blood flow to the heart muscle. This lack of blood flow can cause damage or death to the heart muscle which is called a heart attack or Myocardial Infarction (MI). There are two different types of MIs:  ST Elevation Myocardial Infarction or STEMI (the most severe type of heart attack) and Non-ST Elevation Myocardial Infarction or NSTEMI.    Treatment Plan:  · Cardiac Diet  - Low fat, low salt, low cholesterol   · Cardiac Rehab  - Your doctor has ordered you a referral to [facility].  Call [phone] to schedule an appointment.  · Attend my follow-up appointment with my Cardiologist.  · Take my medications as  prescribed by my doctor  · Exercise daily  · Lower my bad cholesterol and raise my good cholesterol and Reduce stress    Medications:  Certain medications are used to treat ACS.  Remember to always take medications as prescribed and never stop talking medications unless told by your doctor.    You have been prescribed the following medicatons:    Aspirin - Aspirin is used as a blood thinning medication and you will require this medication indefinitely.  Anti-platelet/blood thinner - Your Anti-platelet/Blood thinning medication is called Effient, and is used in combination with aspirin to prevent clots from forming in your heart and/or around your stent.  Your doctor will determine how long you need to be on this medicine.  Beta-Blocker - Beta-Blocker Carvedilol is used to lower blood pressure and heart rate, and/or helps your heart heal after a heart attack.  Statin - Statin Crestor is used to lower cholesterol.    · Is patient discharged on Warfarin / Coumadin?   No     Depression / Suicide Risk    As you are discharged from this [facility] facility, it is important to learn how to keep safe from harming yourself.    Recognize the warning signs:  · Abrupt changes in personality, positive or negative- including increase in energy   · Giving away possessions  · Change in eating patterns- significant weight changes-  positive or negative  · Change in sleeping patterns- unable to sleep or sleeping all the time   · Unwillingness or inability to communicate  · Depression  · Unusual sadness, discouragement and loneliness  · Talk of wanting to die  · Neglect of personal appearance   · Rebelliousness- reckless behavior  · Withdrawal from people/activities they love  · Confusion- inability to concentrate     If you or a loved one observes any of these behaviors or has concerns about self-harm, here's what you can do:  · Talk about it- your feelings and reasons for harming yourself  · Remove any means that you might use to  hurt yourself (examples: pills, rope, extension cords, firearm)  · Get professional help from the community (Mental Health, Substance Abuse, psychological counseling)  · Do not be alone:Call your Safe Contact- someone whom you trust who will be there for you.  · Call your local CRISIS HOTLINE 465-5484 or 537-246-4344  · Call your local Children's Mobile Crisis Response Team [place] (325) 965-1197 or [employer]  · Call the toll free National Suicide Prevention Hotlines   · National Suicide Prevention Lifeline 370-471-LDEO (9701)  · [facility] Hope Line Network 800-SUICIDE (619-7294)      Acute Coronary Syndrome  Acute coronary syndrome (ACS) is a serious problem in which there is suddenly not enough blood and oxygen supplied to the heart. ACS may mean that one or more of the blood vessels in your heart (coronary arteries) may be blocked. ACS can result in chest pain or a heart attack (myocardial infarction or MI).  What are the causes?  This condition is caused by atherosclerosis, which is the buildup of fat and cholesterol (plaque) on the inside of the arteries. Over time, the plaque may narrow or block the artery, and this will lessen blood flow to the heart. Plaque can also become weak and break off within a coronary artery to form a clot and cause a sudden blockage.  What increases the risk?  The risk factors of this condition include:  · High cholesterol levels.  · High blood pressure (hypertension).  · Smoking.  · Diabetes.  · Age.  · Family history of chest pain, heart disease, or stroke.  · Lack of exercise.  What are the signs or symptoms?  The most common signs of this condition include:  · Chest pain, which can be:  ¨ A crushing or squeezing in the chest.  ¨ A tightness, pressure, fullness, or heaviness in the chest.  ¨ Present for more than a few minutes, or it can stop and recur.  · Pain in the arms, neck, jaw, or back.  · Unexplained heartburn or indigestion.  · Shortness of  breath.  · Nausea.  · Sudden cold sweats.  · Feeling light-headed or dizzy.  Sometimes, this condition has no symptoms.  How is this diagnosed?  ACS may be diagnosed through the following tests:  · Electrocardiogram (ECG).  · Blood tests.  · Coronary angiogram. This is a procedure to look at the coronary arteries to see if there is any blockage.  How is this treated?  Treatment for ACS may include:  · Healthy behavioral changes to reduce or control risk factors.  · Medicine.  · Coronary stenting. A stent helps to keep an artery open.  · Coronary angioplasty. This procedure widens a narrowed or blocked artery.  · Coronary artery bypass surgery. This will allow your blood to pass the blockage (bypass) to reach your heart.  Follow these instructions at home:  Eating and drinking  · Follow a heart-healthy diet. A dietitian can you help to educate you about healthy food options and changes.  · Use healthy cooking methods such as roasting, grilling, broiling, baking, poaching, steaming, or stir-frying. Talk to a dietitian to learn more about healthy cooking methods.  Medicines  · Take medicines only as directed by your health care provider.  · Do not take the following medicines unless your health care provider approves:  ¨ Nonsteroidal anti-inflammatory drugs (NSAIDs), such as ibuprofen, naproxen, or celecoxib.  ¨ Vitamin supplements that contain vitamin A, vitamin E, or both.  ¨ Hormone replacement therapy that contains estrogen with or without progestin.  · Stop illegal drug use.  Activity  · Follow an exercise program that is approved by your health care provider.  · Plan rest periods when you are fatigued.  Lifestyle  · Do not use any tobacco products, including cigarettes, chewing tobacco, or electronic cigarettes. If you need help quitting, ask your health care provider.  · If you drink alcohol, and your health care provider approves, limit your alcohol intake to no more than 1 drink per day. One drink equals 12  ounces of beer, 5 ounces of wine, or 1½ ounces of hard liquor.  · Learn to manage stress.  · Maintain a healthy weight. Lose weight as approved by your health care provider.  General instructions  · Manage other health conditions, such as hypertension and diabetes, as directed by your health care provider.  · Keep all follow-up visits as directed by your health care provider. This is important.  · Your health care provider may ask you to monitor your blood pressure. A blood pressure reading consists of a higher number over a lower number, such as 110 over 72, written as 110/72. Ideally, your blood pressure should be:  ¨ Below 140/90 if you have no other medical conditions.  ¨ Below 130/80 if you have diabetes or kidney disease.  Get help right away if:  · You have pain in your chest, neck, arm, jaw, stomach, or back that lasts more than a few minutes, is recurring, or is not relieved by taking medicine under your tongue (sublingual nitroglycerin).  · You have profuse sweating without cause.  · You have unexplained:  ¨ Heartburn or indigestion.  ¨ Shortness of breath or difficulty breathing.  ¨ Nausea or vomiting.  ¨ Fatigue.  ¨ Feelings of nervousness or anxiety.  ¨ Weakness.  ¨ Diarrhea.  · You have sudden light-headedness or dizziness.  · You faint.  These symptoms may represent a serious problem that is an emergency. Do not wait to see if the symptoms will go away. Get medical help right away. Call your local emergency services (911 in the [place] Do not drive yourself to the clinic or hospital.   This information is not intended to replace advice given to you by your health care provider. Make sure you discuss any questions you have with your health care provider.  Document Released: 12/18/2006 Document Revised: 05/31/2017 Document Reviewed: 04/21/2015  [employer] Interactive Patient Education © 2017 [employer]      Heart Attack  A heart attack (myocardial infarction, MI) causes damage to the heart that cannot  be fixed. A heart attack often happens when a blood clot or other blockage cuts blood flow to the heart. When this happens, certain areas of the heart begin to die. This causes the pain you feel during a heart attack.  Follow these instructions at home:  · Take medicine as told by your doctor. You may need medicine to:  ¨ Keep your blood from clotting too easily.  ¨ Control your blood pressure.  ¨ Lower your cholesterol.  ¨ Control abnormal heart rhythms.  · Change certain behaviors as told by your doctor. This may include:  ¨ Quitting smoking.  ¨ Being active.  ¨ Eating a heart-healthy diet. Ask your doctor for help with this diet.  ¨ Keeping a healthy weight.  ¨ Keeping your diabetes under control.  ¨ Lessening stress.  ¨ Limiting how much alcohol you drink.  Do not take these medicines unless your doctor says that you can:  · Nonsteroidal anti-inflammatory drugs (NSAIDs). These include:  ¨ Ibuprofen.  ¨ Naproxen.  ¨ Celecoxib.  · Vitamin supplements that have vitamin A, vitamin E, or both.  · Hormone therapy that contains estrogen with or without progestin.  Get help right away if:  · You have sudden chest discomfort.  · You have sudden discomfort in your:  ¨ Arms.  ¨ Back.  ¨ Neck.  ¨ Jaw.  · You have shortness of breath at any time.  · You have sudden sweating or clammy skin.  · You feel sick to your stomach (nauseous) or throw up (vomit).  · You suddenly get light-headed or dizzy.  · You feel your heart beating fast or skipping beats.  These symptoms may be an emergency. Do not wait to see if the symptoms will go away. Get medical help right away. Call your local emergency services (911 in the [place] Do not drive yourself to the hospital.   This information is not intended to replace advice given to you by your health care provider. Make sure you discuss any questions you have with your health care provider.  Document Released: 06/18/2013 Document Revised: 05/25/2017 Document Reviewed: 02/20/2015  [employer]  Interactive Patient Education © 2017 [employer]      Coronary Angiogram With Stent  Coronary angiogram with stent placement is a procedure to widen or open a narrow blood vessel of the heart (coronary artery). Arteries may become blocked by cholesterol buildup (plaques) in the lining or wall. When a coronary artery becomes partially blocked, blood flow to that area decreases. This may lead to chest pain or a heart attack (myocardial infarction).  A stent is a small piece of metal that looks like mesh or a spring. Stent placement may be done as treatment for a heart attack or right after a coronary angiogram in which a blocked artery is found.  Let your health care provider know about:  · Any allergies you have.  · All medicines you are taking, including vitamins, herbs, eye drops, creams, and over-the-counter medicines.  · Any problems you or family members have had with anesthetic medicines.  · Any blood disorders you have.  · Any surgeries you have had.  · Any medical conditions you have.  · Whether you are pregnant or may be pregnant.  What are the risks?  Generally, this is a safe procedure. However, problems may occur, including:  · Damage to the heart or its blood vessels.  · A return of blockage.  · Bleeding, infection, or bruising at the insertion site.  · A collection of blood under the skin (hematoma) at the insertion site.  · A blood clot in another part of the body.  · Kidney injury.  · Allergic reaction to the dye or contrast that is used.  · Bleeding into the abdomen (retroperitoneal bleeding).  What happens before the procedure?  Staying hydrated   Follow instructions from your health care provider about hydration, which may include:  · Up to 2 hours before the procedure - you may continue to drink clear liquids, such as water, clear fruit juice, black coffee, and plain tea.  Eating and drinking restrictions   Follow instructions from your health care provider about eating and drinking, which may  include:  · 8 hours before the procedure - stop eating heavy meals or foods such as meat, fried foods, or fatty foods.  · 6 hours before the procedure - stop eating light meals or foods, such as toast or cereal.  · 2 hours before the procedure - stop drinking clear liquids.  Ask your health care provider about:  · Changing or stopping your regular medicines. This is especially important if you are taking diabetes medicines or blood thinners.  · Taking medicines such as ibuprofen. These medicines can thin your blood. Do not take these medicines before your procedure if your health care provider instructs you not to. Generally, aspirin is recommended before a procedure of passing a small, thin tube (catheter) through a blood vessel and into the heart (cardiac catheterization).  What happens during the procedure?  · An IV tube will be inserted into one of your veins.  · You will be given one or more of the following:  ¨ A medicine to help you relax (sedative).  ¨ A medicine to numb the area where the catheter will be inserted into an artery (local anesthetic).  · To reduce your risk of infection:  ¨ Your health care team will wash or sanitize their hands.  ¨ Your skin will be washed with soap.  ¨ Hair may be removed from the area where the catheter will be inserted.  · Using a guide wire, the catheter will be inserted into an artery. The location may be in your groin, in your wrist, or in the fold of your arm (near your elbow).  · A type of X-ray (fluoroscopy) will be used to help guide the catheter to the opening of the arteries in the heart.  · A dye will be injected into the catheter, and X-rays will be taken. The dye will help to show where any narrowing or blockages are located in the arteries.  · A tiny wire will be guided to the blocked spot, and a balloon will be inflated to make the artery wider.  · The stent will be expanded and will crush the plaques into the wall of the vessel. The stent will hold the area  open and improve the blood flow. Most stents have a drug coating to reduce the risk of the stent narrowing over time.  · The artery may be made wider using a drill, laser, or other tools to remove plaques.  · When the blood flow is better, the catheter will be removed. The lining of the artery will grow over the stent, which stays where it was placed.  This procedure may vary among health care providers and hospitals.  What happens after the procedure?  · If the procedure is done through the leg, you will be kept in bed lying flat for about 6 hours. You will be instructed to not bend and not cross your legs.  · The insertion site will be checked frequently.  · The pulse in your foot or wrist will be checked frequently.  · You may have additional blood tests, X-rays, and a test that records the electrical activity of your heart (electrocardiogram, or ECG).  This information is not intended to replace advice given to you by your health care provider. Make sure you discuss any questions you have with your health care provider.  Document Released: 06/23/2004 Document Revised: 08/17/2017 Document Reviewed: 07/23/2017  [employer] Interactive Patient Education © 2017 [employer]    Prasugrel oral tablets  What is this medicine?  PRASUGREL (PRA harini grel) helps to prevent blood clots. This medicine is used to prevent heart attack, stroke, or other vascular events in people who are at high risk.  This medicine may be used for other purposes; ask your health care provider or pharmacist if you have questions.  COMMON BRAND NAME(S): Effient  What should I tell my health care provider before I take this medicine?  They need to know if you have any of these conditions:  -bleeding disorders  -kidney disease  -liver disease  -recent trauma or surgery  -stomach or intestinal ulcers  -stroke or transient ischemic attack  -an unusual or allergic reaction to prasugrel, other medicines, foods, dyes, or preservatives  -pregnant or trying  to get pregnant  -breast-feeding  How should I use this medicine?  Take this medicine by mouth with a drink of water. Follow the directions on the prescription label. You may take this medicine with or without food. If it upsets your stomach, take it with food. This medicine may be chewed or it may be crushed and put into food or liquids such as applesauce, juice, or water as long as it is taken immediately. This medicine has a bitter taste that you may notice if it is chewed or crushed. Take your medicine at regular intervals. Do not take your medicine more often than directed. Do not stop taking except on your doctor's advice.  Talk to your pediatrician regarding the use of this medicine in children. Special care may be needed.  Overdosage: If you think you have taken too much of this medicine contact a poison control center or emergency room at once.  NOTE: This medicine is only for you. Do not share this medicine with others.  What if I miss a dose?  If you miss a dose, take it as soon as you can. If it is almost time for your next dose, take only that dose. Do not take double or extra doses.  What may interact with this medicine?  -aspirin  -certain medicines that treat or prevent blood clots like warfarin, enoxaparin, and dalteparin  -NSAIDS, medicines for pain and inflammation, like ibuprofen or naproxen  This list may not describe all possible interactions. Give your health care provider a list of all the medicines, herbs, non-prescription drugs, or dietary supplements you use. Also tell them if you smoke, drink alcohol, or use illegal drugs. Some items may interact with your medicine.  What should I watch for while using this medicine?  Visit your doctor or health care professional for regular check ups. Do not stop taking your medicine unless your doctor tells you to.  Notify your doctor or health care professional and seek emergency treatment if you develop breathing problems; changes in vision; chest  pain; severe, sudden headache; pain, swelling, warmth in the leg; trouble speaking; sudden numbness or weakness of the face, arm, or leg. These can be signs that your condition has gotten worse.  If you are going to have surgery or dental work, tell your doctor or health care professional that you are taking this medicine.  What side effects may I notice from receiving this medicine?  Side effects that you should report to your doctor or health care professional as soon as possible:  -allergic reactions like skin rash, itching or hives, swelling of the face, lips, or tongue  -signs and symptoms of bleeding such as bloody or black, tarry stools; red or dark-brown urine; spitting up blood or brown material that looks like coffee grounds; red spots on the skin; unusual bruising or bleeding from the eye, gums, or nose  Side effects that usually do not require medical attention (report to your doctor or health care professional if they continue or are bothersome):  -diarrhea  -headache  -nausea, vomiting  -pain in back, arms or legs  This list may not describe all possible side effects. Call your doctor for medical advice about side effects. You may report side effects to FDA at 0-833-FDA-5745.  Where should I keep my medicine?  Keep out of the reach of children.  Store at room temperature between 15 and 30 degrees C (59 and 86 degrees F). Keep this medicine in the original container. Keep container closed and do not remove the gray cylinder from the bottle. Throw away any unused medicine after the expiration date.  NOTE: This sheet is a summary. It may not cover all possible information. If you have questions about this medicine, talk to your doctor, pharmacist, or health care provider.  © 2018 [employer]vier[employer] (2016-01-27 10:14:24)    Carvedilol tablets  What is this medicine?  CARVEDILOL (JENNIFER ve dil ol) is a beta-blocker. Beta-blockers reduce the workload on the heart and help it to beat more regularly. This  medicine is used to treat high blood pressure and heart failure.  This medicine may be used for other purposes; ask your health care provider or pharmacist if you have questions.  COMMON BRAND NAME(S): Coreg  What should I tell my health care provider before I take this medicine?  They need to know if you have any of these conditions:  -circulation problems  -diabetes  -history of heart attack or heart disease  -liver disease  -lung or breathing disease, like asthma or emphysema  -pheochromocytoma  -slow or irregular heartbeat  -thyroid disease  -an unusual or allergic reaction to carvedilol, other beta-blockers, medicines, foods, dyes, or preservatives  -pregnant or trying to get pregnant  -breast-feeding  How should I use this medicine?  Take this medicine by mouth with a glass of water. Follow the directions on the prescription label. It is best to take the tablets with food. Take your doses at regular intervals. Do not take your medicine more often than directed. Do not stop taking except on the advice of your doctor or health care professional.  Talk to your pediatrician regarding the use of this medicine in children. Special care may be needed.  Overdosage: If you think you have taken too much of this medicine contact a poison control center or emergency room at once.  NOTE: This medicine is only for you. Do not share this medicine with others.  What if I miss a dose?  If you miss a dose, take it as soon as you can. If it is almost time for your next dose, take only that dose. Do not take double or extra doses.  What may interact with this medicine?  This medicine may interact with the following medications:  -certain medicines for blood pressure, heart disease, irregular heart beat  -certain medicines for depression, like fluoxetine or paroxetine  -certain medicines for diabetes, like glipizide or glyburide  -cimetidine  -clonidine  -cyclosporine  -digoxin  -MAOIs like Carbex, Eldepryl, Marplan, Nardil, and  Parnate  -reserpine  -rifampin  This list may not describe all possible interactions. Give your health care provider a list of all the medicines, herbs, non-prescription drugs, or dietary supplements you use. Also tell them if you smoke, drink alcohol, or use illegal drugs. Some items may interact with your medicine.  What should I watch for while using this medicine?  Check your heart rate and blood pressure regularly while you are taking this medicine. Ask your doctor or health care professional what your heart rate and blood pressure should be, and when you should contact him or her. Do not stop taking this medicine suddenly. This could lead to serious heart-related effects.  Contact your doctor or health care professional if you have difficulty breathing while taking this drug.  Check your weight daily. Ask your doctor or health care professional when you should notify him/her of any weight gain.  You may get drowsy or dizzy. Do not drive, use machinery, or do anything that requires mental alertness until you know how this medicine affects you. To reduce the risk of dizzy or fainting spells, do not sit or stand up quickly. Alcohol can make you more drowsy, and increase flushing and rapid heartbeats. Avoid alcoholic drinks.  If you have diabetes, check your blood sugar as directed. Tell your doctor if you have changes in your blood sugar while you are taking this medicine.  If you are going to have surgery, tell your doctor or health care professional that you are taking this medicine.  What side effects may I notice from receiving this medicine?  Side effects that you should report to your doctor or health care professional as soon as possible:  -allergic reactions like skin rash, itching or hives, swelling of the face, lips, or tongue  -breathing problems  -dark urine  -irregular heartbeat  -swollen legs or ankles  -vomiting  -yellowing of the eyes or skin  Side effects that usually do not require medical  attention (report to your doctor or health care professional if they continue or are bothersome):  -change in sex drive or performance  -diarrhea  -dry eyes (especially if wearing contact lenses)  -dry, itching skin  -headache  -nausea  -unusually tired  This list may not describe all possible side effects. Call your doctor for medical advice about side effects. You may report side effects to FDA at 2-083-NOD-5877.  Where should I keep my medicine?  Keep out of the reach of children.  Store at room temperature below 30 degrees C (86 degrees F). Protect from moisture. Keep container tightly closed. Throw away any unused medicine after the expiration date.  NOTE: This sheet is a summary. It may not cover all possible information. If you have questions about this medicine, talk to your doctor, pharmacist, or health care provider.  © 2018 Elsevier/Gold Standard (2014-08-24 14:12:02)    Losartan tablets  What is this medicine?  LOSARTAN (beverly NATHANIEL tan) is used to treat high blood pressure and to reduce the risk of stroke in certain patients. This drug also slows the progression of kidney disease in patients with diabetes.  This medicine may be used for other purposes; ask your health care provider or pharmacist if you have questions.  COMMON BRAND NAME(S): Cozaar  What should I tell my health care provider before I take this medicine?  They need to know if you have any of these conditions:  -heart failure  -kidney or liver disease  -an unusual or allergic reaction to losartan, other medicines, foods, dyes, or preservatives  -pregnant or trying to get pregnant  -breast-feeding  How should I use this medicine?  Take this medicine by mouth with a glass of water. Follow the directions on the prescription label. This medicine can be taken with or without food. Take your doses at regular intervals. Do not take your medicine more often than directed.  Talk to your pediatrician regarding the use of this medicine in children.  Special care may be needed.  Overdosage: If you think you have taken too much of this medicine contact a poison control center or emergency room at once.  NOTE: This medicine is only for you. Do not share this medicine with others.  What if I miss a dose?  If you miss a dose, take it as soon as you can. If it is almost time for your next dose, take only that dose. Do not take double or extra doses.  What may interact with this medicine?  -blood pressure medicines  -diuretics, especially triamterene, spironolactone, or amiloride  -fluconazole  -NSAIDs, medicines for pain and inflammation, like ibuprofen or naproxen  -potassium salts or potassium supplements  -rifampin  This list may not describe all possible interactions. Give your health care provider a list of all the medicines, herbs, non-prescription drugs, or dietary supplements you use. Also tell them if you smoke, drink alcohol, or use illegal drugs. Some items may interact with your medicine.  What should I watch for while using this medicine?  Visit your doctor or health care professional for regular checks on your progress. Check your blood pressure as directed. Ask your doctor or health care professional what your blood pressure should be and when you should contact him or her. Call your doctor or health care professional if you notice an irregular or fast heart beat.  Women should inform their doctor if they wish to become pregnant or think they might be pregnant. There is a potential for serious side effects to an unborn child, particularly in the second or third trimester. Talk to your health care professional or pharmacist for more information.  You may get drowsy or dizzy. Do not drive, use machinery, or do anything that needs mental alertness until you know how this drug affects you. Do not stand or sit up quickly, especially if you are an older patient. This reduces the risk of dizzy or fainting spells. Alcohol can make you more drowsy and dizzy.  Avoid alcoholic drinks.  Avoid salt substitutes unless you are told otherwise by your doctor or health care professional.  Do not treat yourself for coughs, colds, or pain while you are taking this medicine without asking your doctor or health care professional for advice. Some ingredients may increase your blood pressure.  What side effects may I notice from receiving this medicine?  Side effects that you should report to your doctor or health care professional as soon as possible:  -confusion, dizziness, light headedness or fainting spells  -decreased amount of urine passed  -difficulty breathing or swallowing, hoarseness, or tightening of the throat  -fast or irregular heart beat, palpitations, or chest pain  -skin rash, itching  -swelling of your face, lips, tongue, hands, or feet  Side effects that usually do not require medical attention (report to your doctor or health care professional if they continue or are bothersome):  -cough  -decreased sexual function or desire  -headache  -nasal congestion or stuffiness  -nausea or stomach pain  -sore or cramping muscles  This list may not describe all possible side effects. Call your doctor for medical advice about side effects. You may report side effects to FDA at 2-270-FDA-8954.  Where should I keep my medicine?  Keep out of the reach of children.  Store at room temperature between 15 and 30 degrees C (59 and 86 degrees F). Protect from light. Keep container tightly closed. Throw away any unused medicine after the expiration date.  NOTE: This sheet is a summary. It may not cover all possible information. If you have questions about this medicine, talk to your doctor, pharmacist, or health care provider.  © 2018 Elsevier/Gold Standard (2009-02-27 16:42:18)    Aspirin, ASA oral tablets  What is this medicine?  ASPIRIN (AS pir in) is a pain reliever. It is used to treat mild pain and fever. This medicine is also used as directed by a doctor to prevent and to treat  heart attacks, to prevent strokes, and to treat arthritis or inflammation.  This medicine may be used for other purposes; ask your health care provider or pharmacist if you have questions.  COMMON BRAND NAME(S): Aspir-Low, Aspir-Annabel, Aspirtab, Kinza Advanced Aspirin, Kinza Aspirin, Kinza Aspirin Extra Strength, Kinza Aspirin Plus, Kinza Extra Strength, Kinza Extra Strength Plus, Kinza Genuine Aspirin, Kinza Womens Aspirin, Bufferin, Bufferin Extra Strength, Bufferin Low Dose  What should I tell my health care provider before I take this medicine?  They need to know if you have any of these conditions:  -anemia  -asthma  -bleeding problems  -child with chickenpox, the flu, or other viral infection  -diabetes  -gout  -if you frequently drink alcohol containing drinks  -kidney disease  -liver disease  -low level of vitamin K  -lupus  -smoke tobacco  -stomach ulcers or other problems  -an unusual or allergic reaction to aspirin, tartrazine dye, other medicines, dyes, or preservatives  -pregnant or trying to get pregnant  -breast-feeding  How should I use this medicine?  Take this medicine by mouth with a glass of water. Follow the directions on the package or prescription label. You can take this medicine with or without food. If it upsets your stomach, take it with food. Do not take your medicine more often than directed.  Talk to your pediatrician regarding the use of this medicine in children. While this drug may be prescribed for children as young as 12 years of age for selected conditions, precautions do apply. Children and teenagers should not use this medicine to treat chicken pox or flu symptoms unless directed by a doctor.  Patients over 65 years old may have a stronger reaction and need a smaller dose.  Overdosage: If you think you have taken too much of this medicine contact a poison control center or emergency room at once.  NOTE: This medicine is only for you. Do not share this medicine with others.  What  if I miss a dose?  If you are taking this medicine on a regular schedule and miss a dose, take it as soon as you can. If it is almost time for your next dose, take only that dose. Do not take double or extra doses.  What may interact with this medicine?  Do not take this medicine with any of the following medications:  -cidofovir  -ketorolac  -probenecid  This medicine may also interact with the following medications:  -alcohol  -alendronate  -bismuth subsalicylate  -flavocoxid  -herbal supplements like feverfew, garlic, lupis, ginkgo biloba, horse chestnut  -medicines for diabetes or glaucoma like acetazolamide, methazolamide  -medicines for gout  -medicines that treat or prevent blood clots like enoxaparin, heparin, ticlopidine, warfarin  -other aspirin and aspirin-like medicines  -NSAIDs, medicines for pain and inflammation, like ibuprofen or naproxen  -pemetrexed  -sulfinpyrazone  -varicella live vaccine  This list may not describe all possible interactions. Give your health care provider a list of all the medicines, herbs, non-prescription drugs, or dietary supplements you use. Also tell them if you smoke, drink alcohol, or use illegal drugs. Some items may interact with your medicine.  What should I watch for while using this medicine?  If you are treating yourself for pain, tell your doctor or health care professional if the pain lasts more than 10 days, if it gets worse, or if there is a new or different kind of pain. Tell your doctor if you see redness or swelling. Also, check with your doctor if you have a fever that lasts for more than 3 days. Only take this medicine to prevent heart attacks or blood clotting if prescribed by your doctor or health care professional.  Do not take aspirin or aspirin-like medicines with this medicine. Too much aspirin can be dangerous. Always read the labels carefully.  This medicine can irritate your stomach or cause bleeding problems. Do not smoke cigarettes or drink  alcohol while taking this medicine. Do not lie down for 30 minutes after taking this medicine to prevent irritation to your throat.  If you are scheduled for any medical or dental procedure, tell your healthcare provider that you are taking this medicine. You may need to stop taking this medicine before the procedure.  This medicine may be used to treat migraines. If you take migraine medicines for 10 or more days a month, your migraines may get worse. Keep a diary of headache days and medicine use. Contact your healthcare professional if your migraine attacks occur more frequently.  What side effects may I notice from receiving this medicine?  Side effects that you should report to your doctor or health care professional as soon as possible:  -allergic reactions like skin rash, itching or hives, swelling of the face, lips, or tongue  -breathing problems  -changes in hearing, ringing in the ears  -confusion  -general ill feeling or flu-like symptoms  -pain on swallowing  -redness, blistering, peeling or loosening of the skin, including inside the mouth or nose  -signs and symptoms of bleeding such as bloody or black, tarry stools; red or dark-brown urine; spitting up blood or brown material that looks like coffee grounds; red spots on the skin; unusual bruising or bleeding from the eye, gums, or nose  -trouble passing urine or change in the amount of urine  -unusually weak or tired  -yellowing of the eyes or skin  Side effects that usually do not require medical attention (report to your doctor or health care professional if they continue or are bothersome):  -diarrhea or constipation  -headache  -nausea, vomiting  -stomach gas, heartburn  This list may not describe all possible side effects. Call your doctor for medical advice about side effects. You may report side effects to FDA at 0-629-FDA-6938.  Where should I keep my medicine?  Keep out of the reach of children.  Store at room temperature between 15 and 30  degrees C (59 and 86 degrees F). Protect from heat and moisture. Do not use this medicine if it has a strong vinegar smell. Throw away any unused medicine after the expiration date.  NOTE: This sheet is a summary. It may not cover all possible information. If you have questions about this medicine, talk to your doctor, pharmacist, or health care provider.  © 2018 Elsevier/Gold Standard (2014-08-19 11:30:31)    Rosuvastatin Tablets  What is this medicine?  ROSUVASTATIN (idalia TIN va sta tin) is known as a HMG-CoA reductase inhibitor or 'statin'. It lowers cholesterol and triglycerides in the blood. This drug may also reduce the risk of heart attack, stroke, or other health problems in patients with risk factors for heart disease. Diet and lifestyle changes are often used with this drug.  This medicine may be used for other purposes; ask your health care provider or pharmacist if you have questions.  COMMON BRAND NAME(S): Crestor  What should I tell my health care provider before I take this medicine?  They need to know if you have any of these conditions:  -frequently drink alcoholic beverages  -kidney disease  -liver disease  -muscle aches or weakness  -other medical condition  -an unusual or allergic reaction to rosuvastatin, other medicines, foods, dyes, or preservatives  -pregnant or trying to get pregnant  -breast-feeding  How should I use this medicine?  Take this medicine by mouth with a glass of water. Follow the directions on the prescription label. Do not cut, crush or chew this medicine. You can take this medicine with or without food. Take your doses at regular intervals. Do not take your medicine more often than directed.  Talk to your pediatrician regarding the use of this medicine in children. While this drug may be prescribed for children as young as 7 years old for selected conditions, precautions do apply.  Overdosage: If you think you have taken too much of this medicine contact a poison control  center or emergency room at once.  NOTE: This medicine is only for you. Do not share this medicine with others.  What if I miss a dose?  If you miss a dose, take it as soon as you can. Do not take 2 doses within 12 hours of each other. If there are less than 12 hours until your next dose, take only that dose. Do not take double or extra doses.  What may interact with this medicine?  Do not take this medicine with any of the following medications:  -herbal medicines like red yeast rice  This medicine may also interact with the following medications:  -alcohol  -antacids containing aluminum hydroxide or magnesium hydroxide  -cyclosporine  -other medicines for high cholesterol  -some medicines for HIV infection  -warfarin  This list may not describe all possible interactions. Give your health care provider a list of all the medicines, herbs, non-prescription drugs, or dietary supplements you use. Also tell them if you smoke, drink alcohol, or use illegal drugs. Some items may interact with your medicine.  What should I watch for while using this medicine?  Visit your doctor or health care professional for regular check-ups. You may need regular tests to make sure your liver is working properly.  Tell your doctor or health care professional right away if you get any unexplained muscle pain, tenderness, or weakness, especially if you also have a fever and tiredness. Your doctor or health care professional may tell you to stop taking this medicine if you develop muscle problems. If your muscle problems do not go away after stopping this medicine, contact your health care professional.  This medicine may affect blood sugar levels. If you have diabetes, check with your doctor or health care professional before you change your diet or the dose of your diabetic medicine.  Avoid taking antacids containing aluminum, calcium or magnesium within 2 hours of taking this medicine.  This drug is only part of a total heart-health  program. Your doctor or a dietician can suggest a low-cholesterol and low-fat diet to help. Avoid alcohol and smoking, and keep a proper exercise schedule.  Do not use this drug if you are pregnant or breast-feeding. Serious side effects to an unborn child or to an infant are possible. Talk to your doctor or pharmacist for more information.  What side effects may I notice from receiving this medicine?  Side effects that you should report to your doctor or health care professional as soon as possible:  -allergic reactions like skin rash, itching or hives, swelling of the face, lips, or tongue  -dark urine  -fever  -joint pain  -muscle cramps, pain  -redness, blistering, peeling or loosening of the skin, including inside the mouth  -trouble passing urine or change in the amount of urine  -unusually weak or tired  -yellowing of the eyes or skin  Side effects that usually do not require medical attention (report to your doctor or health care professional if they continue or are bothersome):  -constipation  -heartburn  -nausea  -stomach gas, pain, upset  This list may not describe all possible side effects. Call your doctor for medical advice about side effects. You may report side effects to FDA at 1-826-FDA-4066.  Where should I keep my medicine?  Keep out of the reach of children.  Store at room temperature between 20 and 25 degrees C (68 and 77 degrees F). Keep container tightly closed (protect from moisture). Throw away any unused medicine after the expiration date.  NOTE: This sheet is a summary. It may not cover all possible information. If you have questions about this medicine, talk to your doctor, pharmacist, or health care provider.  © 2018 Elsevier/Gold Standard (2016-06-02 13:33:08)

## 2018-09-07 NOTE — PROGRESS NOTES
Verified with MD regarding pending results of echo. Per Dr. Arrington, already aware of results today. Cost of prescribed effient provided to pt and family. Pt OK to be discharged.

## 2018-09-07 NOTE — CARE PLAN
Problem: Safety  Goal: Will remain free from falls    Intervention: Implement fall precautions  Pt and family educated regarding fall precaution protocol. Bed locked and in low position. Call light and belongings within reach.       Problem: Knowledge Deficit  Goal: Knowledge of disease process/condition, treatment plan, diagnostic tests, and medications will improve    Intervention: Explain information regarding disease process/condition, treatment plan, diagnostic tests, and medications and document in education  Pt and wife updated on POC. Current medications explained and questions answered. No other pt needs at this time.

## 2018-09-07 NOTE — DISCHARGE SUMMARY
Discharge Summary    CHIEF COMPLAINT ON ADMISSION  No chief complaint on file.      Reason for Admission  Stemi     Admission Date  9/5/2018    CODE STATUS  Full Code    HPI & HOSPITAL COURSE  This is a 53 y.o. male here with ST elevation myocardial infarction.  Cardiology was immediately consulted he underwent left heart catheterization with Placement of 3.25x23 mm Xience Nila stent in the mid circumflex.  Postoperatively he has done well.  He does have acute renal insufficiency when he came in which was addressed with hydration and has resolved.  He states noted to be elevated at 9, he will need follow-up with this as outpatient.  Cardiology is clear for discharge       Therefore, he is discharged in good and stable condition to home with close outpatient follow-up.    The patient met 2-midnight criteria for an inpatient stay at the time of discharge.    Discharge Date  9/7/2018    FOLLOW UP ITEMS POST DISCHARGE  Follow-up with cardiology  Follow with PCP to recheck TSH, check free T4 and free T3    DISCHARGE DIAGNOSES  Principal Problem:    STEMI (ST elevation myocardial infarction) (HCC) POA: Yes  Active Problems:    CAD (coronary artery disease) POA: Yes    Overweight POA: Yes    TSH elevation POA: Unknown    Acute renal insufficiency POA: Unknown  Resolved Problems:    * No resolved hospital problems. *      FOLLOW UP  Future Appointments  Date Time Provider Department Center   10/8/2018 1:40 PM JAGDISH Solis [facility] None     [facility]  [address]  [address]  [facility] [id number] [phone]  Call on 10/8/2018  You have been referred to Intensive Cardiac Rehab but cannot begin for 6 weeks to allow time for your heart to heal. If you do not hear from [facility] in about 5 weeks, please call them to schedule. Thank you!    Kalin Ash M.D.  [address]  [address]  [facility] [address]  [phone]      Office will call you to schedule your follow up appointment [facility]   left message thank you       MEDICATIONS ON DISCHARGE     Medication List      START taking these medications      Instructions   aspirin 81 MG EC tablet   Take 1 Tab by mouth every day.  Dose:  81 mg     carvedilol 6.25 MG Tabs  Commonly known as:  COREG   Take 1 Tab by mouth 2 times a day, with meals.  Dose:  6.25 mg     losartan 25 MG Tabs  Commonly known as:  COZAAR   Take 1 Tab by mouth every day.  Dose:  25 mg     prasugrel 10 MG Tabs  Commonly known as:  EFFIENT   Take 1 Tab by mouth every day.  Dose:  10 mg     rosuvastatin 40 MG tablet  Commonly known as:  CRESTOR   Take 1 Tab by mouth every evening.  Dose:  40 mg            Allergies  No Known Allergies    DIET  Orders Placed This Encounter   Procedures   • Diet Order Cardiac (No Sausage or Pork)     Standing Status:   Standing     Number of Occurrences:   1     Order Specific Question:   Diet:     Answer:   Cardiac [6]     Comments:   No Sausage or Pork       ACTIVITY  As tolerated.  Weight bearing as tolerated    CONSULTATIONS  Cardiology - Emily    PROCEDURES  Placement of 3.25x23 mm Xience Inla stent in the mid circumflex. 9/5/2018       LABORATORY  Lab Results   Component Value Date    SODIUM 138 09/07/2018    POTASSIUM 3.8 09/07/2018    CHLORIDE 107 09/07/2018    CO2 24 09/07/2018    GLUCOSE 104 (H) 09/07/2018    BUN 10 09/07/2018    CREATININE 0.98 09/07/2018        Lab Results   Component Value Date    WBC 8.8 09/07/2018    HEMOGLOBIN 15.5 09/07/2018    HEMATOCRIT 45.3 09/07/2018    PLATELETCT 206 09/07/2018        Total time of the discharge process exceeds  40 minutes.

## 2018-09-07 NOTE — PROGRESS NOTES
Pt alert and oriented x4. Pt provided with copy of discharge instructions and education regarding new meds. Prescriptions e-scribed to pharmacy. IV, tele box, and armband removed. All needs met. All personal belongings with pt and family.

## 2018-09-08 ENCOUNTER — PATIENT OUTREACH (OUTPATIENT)
Dept: HEALTH INFORMATION MANAGEMENT | Facility: OTHER | Age: 53
End: 2018-09-08

## 2018-09-08 DIAGNOSIS — I21.3 ST ELEVATION MYOCARDIAL INFARCTION (STEMI), UNSPECIFIED ARTERY (HCC): ICD-10-CM

## 2018-09-10 ENCOUNTER — HOSPITAL ENCOUNTER (EMERGENCY)
Facility: MEDICAL CENTER | Age: 53
End: 2018-09-10
Payer: COMMERCIAL

## 2018-09-18 ENCOUNTER — OFFICE VISIT (OUTPATIENT)
Dept: CARDIOLOGY | Facility: MEDICAL CENTER | Age: 53
End: 2018-09-18
Payer: COMMERCIAL

## 2018-09-18 VITALS
HEIGHT: 68 IN | WEIGHT: 177.6 LBS | BODY MASS INDEX: 26.92 KG/M2 | DIASTOLIC BLOOD PRESSURE: 80 MMHG | HEART RATE: 57 BPM | SYSTOLIC BLOOD PRESSURE: 112 MMHG | OXYGEN SATURATION: 96 %

## 2018-09-18 DIAGNOSIS — I25.110 CORONARY ARTERY DISEASE INVOLVING NATIVE HEART WITH UNSTABLE ANGINA PECTORIS, UNSPECIFIED VESSEL OR LESION TYPE (HCC): ICD-10-CM

## 2018-09-18 PROBLEM — Z79.899 HIGH RISK MEDICATION USE: Status: RESOLVED | Noted: 2018-09-06 | Resolved: 2018-09-18

## 2018-09-18 PROCEDURE — 99214 OFFICE O/P EST MOD 30 MIN: CPT | Mod: 29 | Performed by: NURSE PRACTITIONER

## 2018-09-18 PROCEDURE — 93000 ELECTROCARDIOGRAM COMPLETE: CPT | Mod: 29 | Performed by: NURSE PRACTITIONER

## 2018-09-18 RX ORDER — CARVEDILOL 6.25 MG/1
6.25 TABLET ORAL 2 TIMES DAILY WITH MEALS
Qty: 180 TAB | Refills: 3 | Status: SHIPPED | OUTPATIENT
Start: 2018-09-18 | End: 2019-05-22 | Stop reason: SDUPTHER

## 2018-09-18 RX ORDER — NITROGLYCERIN 0.4 MG/1
0.4 TABLET SUBLINGUAL PRN
Qty: 25 TAB | Refills: 2 | Status: SHIPPED | OUTPATIENT
Start: 2018-09-18

## 2018-09-18 RX ORDER — PRASUGREL 10 MG/1
10 TABLET, FILM COATED ORAL DAILY
Qty: 90 TAB | Refills: 3 | Status: SHIPPED | OUTPATIENT
Start: 2018-09-18 | End: 2019-09-30 | Stop reason: SDUPTHER

## 2018-09-18 RX ORDER — LOSARTAN POTASSIUM 25 MG/1
25 TABLET ORAL DAILY
Qty: 90 TAB | Refills: 3 | Status: SHIPPED | OUTPATIENT
Start: 2018-09-18 | End: 2019-09-30 | Stop reason: SDUPTHER

## 2018-09-18 RX ORDER — ROSUVASTATIN CALCIUM 40 MG/1
40 TABLET, COATED ORAL EVERY EVENING
Qty: 90 TAB | Refills: 3 | Status: SHIPPED | OUTPATIENT
Start: 2018-09-18 | End: 2019-09-30 | Stop reason: SDUPTHER

## 2018-09-18 ASSESSMENT — ENCOUNTER SYMPTOMS
VOMITING: 0
NAUSEA: 0
COUGH: 0
ORTHOPNEA: 0
DIZZINESS: 0
WEAKNESS: 0
PND: 0
WHEEZING: 0
HEMOPTYSIS: 0
SPUTUM PRODUCTION: 0
PALPITATIONS: 0
SHORTNESS OF BREATH: 0
CLAUDICATION: 0

## 2018-09-18 NOTE — PROGRESS NOTES
Chief Complaint   Patient presents with   • Coronary Artery Disease       Subjective:   Jayson Mccurdy is a 53 y.o. male who presents today with his wife for hospital follow up STEMI.    He was hospitalized September 7, 2018 for ST elevation myocardial infarction.  He underwent emergent cardiac catheterization with the placement of a stent to his mid circumflex  VFIb during the procedure, was shocked x 1 , Afib post cardioversion, transiently .  Troponin peaked at 39. Echocardiogram showed an ejection fraction of 55% Distal lateralwall not well visualized,but is probably mildly hypokinetic.    Patient is doing well.  He is a retired  currently working a [employer] as a .    He has had no episodes of chest pain, palpitations, dizziness/lightheadedness, shortness of breath, orthopnea, or peripheral edema.    He is normally very active.    Strong family history of early CAD.     Past Medical History:   Diagnosis Date   • Heart attack (HCC)    • High risk medication use 9/6/2018     History reviewed. No pertinent surgical history.  Family History   Problem Relation Age of Onset   • No Known Problems Mother    • Heart Attack Father 42        cabg   • Hypertension Father    • Heart Disease Father    • Heart Failure Father    • Heart Attack Brother 38   • Heart Attack Paternal Uncle 50     Social History     Social History   • Marital status:      Spouse name: N/A   • Number of children: N/A   • Years of education: N/A     Occupational History   • Not on file.     Social History Main Topics   • Smoking status: Never Smoker   • Smokeless tobacco: Never Used   • Alcohol use 0.6 oz/week     1 Glasses of wine per week      Comment: once a month    • Drug use: No   • Sexual activity: Not on file     Other Topics Concern   • Not on file     Social History Narrative   • No narrative on file     No Known Allergies  Outpatient Encounter Prescriptions as of 9/18/2018   Medication Sig  "Dispense Refill   • carvedilol (COREG) 6.25 MG Tab Take 1 Tab by mouth 2 times a day, with meals. 180 Tab 3   • losartan (COZAAR) 25 MG Tab Take 1 Tab by mouth every day. 90 Tab 3   • prasugrel (EFFIENT) 10 MG Tab Take 1 Tab by mouth every day. 90 Tab 3   • rosuvastatin (CRESTOR) 40 MG tablet Take 1 Tab by mouth every evening. 90 Tab 3   • nitroglycerin (NITROSTAT) 0.4 MG SL Tab Place 1 Tab under tongue as needed for Chest Pain. 25 Tab 2   • aspirin EC 81 MG EC tablet Take 1 Tab by mouth every day. 100 Tab 1   • [DISCONTINUED] prasugrel (EFFIENT) 10 MG Tab Take 1 Tab by mouth every day. 30 Tab 11   • [DISCONTINUED] rosuvastatin (CRESTOR) 40 MG tablet Take 1 Tab by mouth every evening. 30 Tab 1   • [DISCONTINUED] losartan (COZAAR) 25 MG Tab Take 1 Tab by mouth every day. 30 Tab 1   • [DISCONTINUED] carvedilol (COREG) 6.25 MG Tab Take 1 Tab by mouth 2 times a day, with meals. 60 Tab 1     No facility-administered encounter medications on file as of 9/18/2018.      Review of Systems   Constitutional: Negative for malaise/fatigue.   Respiratory: Negative for cough, hemoptysis, sputum production, shortness of breath and wheezing.    Cardiovascular: Negative for chest pain, palpitations, orthopnea, claudication, leg swelling and PND.   Gastrointestinal: Negative for nausea and vomiting.   Neurological: Negative for dizziness and weakness.        Objective:   /80   Pulse (!) 57   Ht 1.727 m (5' 8\")   Wt 80.6 kg (177 lb 9.6 oz)   SpO2 96%   BMI 27.00 kg/m²     Physical Exam   Constitutional: He is oriented to person, place, and time. He appears well-developed and well-nourished. No distress.   HENT:   Head: Normocephalic and atraumatic.   Eyes: Pupils are equal, round, and reactive to light.   Neck: No JVD present.   Cardiovascular: Normal rate, regular rhythm and normal heart sounds.    Pulmonary/Chest: Effort normal and breath sounds normal.   Abdominal: Soft. Bowel sounds are normal. He exhibits no distension. "   Musculoskeletal: He exhibits no edema.   Neurological: He is alert and oriented to person, place, and time.   Skin: Skin is warm and dry. He is not diaphoretic.   Psychiatric: He has a normal mood and affect. His behavior is normal. Judgment and thought content normal.       Echocardiography Laboratory  9/7/18  Technically difficult study - adequate information is obtained.   Left ventricular ejection fraction is visually estimated to be 55%.  Distal lateralwall not well visualized,but is probably mildly hypokinetic.  Remander of LV wallotion is normal.  The left atrium is normal in size.  Structurally normal mitral valve without significant stenosis or regurgitation.  Structurally normal aortic valve without significant stenosis or regurgitation.  Normal pericardium without effusion.    Cardiac catheterization   9/6/18  Findings: 100% mid Circ stented with good result.  Moderate stenosis in prox LAD and Mid RCA  EF 45% with distal inferior wall and apical akinesis    Assessment:     1. Coronary artery disease involving native heart with unstable angina pectoris, unspecified vessel or lesion type ([facility])  [facility]Clinic Performed)    carvedilol (COREG) 6.25 MG Tab    losartan (COZAAR) 25 MG Tab    prasugrel (EFFIENT) 10 MG Tab    rosuvastatin (CRESTOR) 40 MG tablet    nitroglycerin (NITROSTAT) 0.4 MG SL Tab    LIPID PROFILE    COMP METABOLIC PANEL       Medical Decision Making:  Today's Assessment / Status / Plan:     1. CAD:  - Pt recovering well post stent placement.  He has not had any recurrence of chest pain or angina.  - EKG today showed SB, no afib   - He has been compliant with his medications without missed doses.  I discussed mechanism of action and importance of cardiac medications, especially DAPT.    - We reviewed heart healthy, low sodium low cholesterol diet today.  We also reviewed recommended activity guidelines per AHA guidelines.  - Cardiac Rehab consult placed.    - continue to take  aspirin 81 mg, carvedilol 6.25 mg twice daily, losartan to 25 mg daily, Crestor 40 mg daily, Effient 10 mg daily  -Start nitroglycerin as needed  - ECHO post PCI echocardiogram showed an ejection fraction of 55%    Since he is retired , he is filing workers comp.    Follow up in 3 months with Dr. Diaz, sooner as needed.     Collaborating Provider: Dr. Diaz

## 2018-09-19 LAB — EKG IMPRESSION: NORMAL

## 2018-10-23 ENCOUNTER — NON-PROVIDER VISIT (OUTPATIENT)
Dept: CARDIOLOGY | Facility: MEDICAL CENTER | Age: 53
End: 2018-10-23
Payer: COMMERCIAL

## 2018-10-23 DIAGNOSIS — Z95.5 STENTED CORONARY ARTERY: Primary | ICD-10-CM

## 2018-10-23 LAB — EKG IMPRESSION: NORMAL

## 2018-10-23 PROCEDURE — G0423 INTENS CARDIAC REHAB NO EXER: HCPCS | Mod: 59 | Performed by: FAMILY MEDICINE

## 2018-10-23 PROCEDURE — G0422 INTENS CARDIAC REHAB W/EXERC: HCPCS | Performed by: FAMILY MEDICINE

## 2018-10-23 ASSESSMENT — PATIENT HEALTH QUESTIONNAIRE - PHQ9: SUM OF ALL RESPONSES TO PHQ QUESTIONS 1-9: 3

## 2018-10-24 ASSESSMENT — PATIENT HEALTH QUESTIONNAIRE - PHQ9
3. TROUBLE FALLING OR STAYING ASLEEP OR SLEEPING TOO MUCH: 0
6. FEELING BAD ABOUT YOURSELF - OR THAT YOU ARE A FAILURE OR HAVE LET YOURSELF OR YOUR FAMILY DOWN: 1
7. TROUBLE CONCENTRATING ON THINGS, SUCH AS READING THE NEWSPAPER OR WATCHING TELEVISION: 0
9. THOUGHTS THAT YOU WOULD BE BETTER OFF DEAD, OR OF HURTING YOURSELF: 0
1. LITTLE INTEREST OR PLEASURE IN DOING THINGS: 0
2. FEELING DOWN, DEPRESSED, IRRITABLE, OR HOPELESS: 0
4. FEELING TIRED OR HAVING LITTLE ENERGY: 1
8. MOVING OR SPEAKING SO SLOWLY THAT OTHER PEOPLE COULD HAVE NOTICED. OR THE OPPOSITE, BEING SO FIGETY OR RESTLESS THAT YOU HAVE BEEN MOVING AROUND A LOT MORE THAN USUAL: 0
5. POOR APPETITE OR OVEREATING: 1
SUM OF ALL RESPONSES TO PHQ QUESTIONS 1-9: 3
SUM OF ALL RESPONSES TO PHQ9 QUESTIONS 1 AND 2: 0

## 2018-10-24 NOTE — PROGRESS NOTES
Cardiac Rehab Individual Treatment Plan Assessment: Initial 10/24/18 Session #     1   MRN: [id number]   Allergies: Patient has no known allergies.   Patient Name: Jayson Mccurdy : 1965 Risk Stratification: High    Diagnoses:   1. Stented coronary artery     Age: 53 y.o. Physician: Kalin Ash M.D.    Date of Event: 18 Specialist: Emily   Risk Factors:  Hypertension, Hyperlipidemia, Family History, Stress, Age, Male > 45   Exercise Nutrition Education Psychosocial   Stages of change Stages of change Stages of change Stages of change   Preparation Preparation Preparation Preparation   Fitness Test Lipids Learning Barriers Outcome Survey Tools   DIST: 1490  Available: Yes Learning Barriers: None FP QOL Overall Score: 20.29   Max HR: 84 Date: 18 Family Support PHQ-9: 3   RPE: 9 Total: 130 mg/dL Yes Nutrition Screen: 61 %   SPO2: 97 % Tri mg/dL Lives: Spouse Intervention   MET: 3.87 HDL: 49 mg/dL Tobacco Use Behavioral Health Consult: Yes   EF= 55 (2018) LDL: 63 mg/dL History   Smoking Status   • Never Smoker   Smokeless Tobacco   • Never Used      Physician Referral: No   Ambulatory Status Diabetes Smoking Intervention Identifies Stressors: Yes   Fall Risk Assessed: Yes Diabetes: No Smoking Cessation Referral: N/A Drug Intervention: No   Exercise Ambulatory Status Assist Devices: None HbA1C: 5.8 % Date: 18 Ind. Education / Counseling: N/A Education   Exercise Prescription Monitors BS at Home: No Tobacco Adjunct: N/A Psychosocial Education: Coping Techniques, Positive Support System, S/S Depression   Mode: Treadmill, NuStep, UBE, Airdyne   Frequency: na Random BS: 104 (2018) Education Intervention Target Goal   Frequency:  3 days/week Weight Management Healthy Heart Education: Class Schedule Given, Medications Reviewed, Patient Education Binder Provided, Risk Factors Discussed, Videos Viewed per Devin Assess presence or absence of depression using a valid screening:  "Yes   Duration:  30-45 min Weight: 79.6 kg (175 lb 8 oz) Target Goal Use Stress Management: Yes   Intensity:  11-13 RPE Height: 172.7 cm (5' 7.99\") Complete Tobacco Cessation: Complete Tobacco Cessation: N/A Adverse Events: Yes   METS:  3.0-5.0 BMI (Calculated): 26.69 Educate / Review and have understanding of cardiac disease prevention: Educate / Review and have understanding of cardiac disease prevention: Yes Unexpected Events: Yes   Progression:  ^ increments of 1-5 to THR/RPE as tolerated Goal weight: 170lb Medication Compliance: Yes    THR: THR: Other (see comment) (130-140) History   Alcohol Use   • 0.6 oz/week   • 1 Glasses of wine per week     Comment: once a month          Angina with Exercise?  Angina with Exercise: No      Resistance Training?  Resistance Training: Yes      Hypertension      Diagnosed with HTN: Yes Intervention      Resting BP: 106/65 Dietician Consult/Class: Pending (scheduled)      Peak Ex BP: 116/74 Nurse/Patient Discussion: Yes     Intervention Diabetes Ed Referral: N/A     Home Exercise:  Yes Discuss Maintenance /Wt Loss: Yes     Mode: Walk, Gym Attend "[employer] School: Pending (scheduled)     Duration: 30-60 min Dietary Goal: >=58 rate your plate, low sodium     Frequency: 7 days active  Education     Education Nutrition Education: Healthy Eating, Sodium Reduction     Equipment Orientation, Exercise Safety, S/S to Report, RPE Scale, Warm Up / Cool Down, Physically Active Target Goal     Target Goal LDL-C < 100 if trig. > 200:  N/A       Start Individual Exercise Rx Yes LDL-C < 70 for high risk patient:  Yes       BP < 140/90 or < 130/80 if DM or CKD Yes Non HDL-C Should be < 130:  Yes       Aerobic activity 30 + min / day 5 days / wk Yes HbA1C < 7%: Yes         BMI < 25: Yes       Notes: Monitor all visits due to insurance.  No body limitations with exercise.    Initial Assessment:  Heart Sounds: S1S2 WNL       Lung Sounds: clear throughout bilaterally       Edema: none present      " "  Right Peripheral Pulses:  Carotid: 2+  Radial: 2+  Dorsalis Pedis: 2+  Posterior Tibialis: 2+   Left Peripheral Pulses:  Carotid: 2+  Radial: 2+  Dorsalis Pedis: 2+  Posterior Tibialis: 2+    Incision: none present       Color: WNL       Frame Size: Medium        Cognitive Assessment: A&O X 4, no cognitive deficits reported.       Fall Assessment:    Gait: steady  Balance: steady  Upper Body: no pain or limitations with ROM  Lower Body: no pain or limitations with ROM   Recent Falls: none reported.   Current Medications and Vaccinations: Reviewed  Patient Stated Goals: \"I would like to get into cardiovascular shape, increase cardio and musle tone.  I would like to learn about what I should be eating diet wise, my wise cooks and will likely attend cooking school. \"  Other: \"Mo\" arrives for orientation.  Exercise current: he used to regularly go to the gym before his stent and has just been walking for about 30 minutes a couple days a week.  He says that because of his work schedule he hasn't had any consistency.  Goal: to increase cardiovascular endurance, build muscle tone, return to regular exercise, the gym and golfing.  Progress: he is willing to start the program and take recommendations.  Nutrition current: his wise does the majority of the cooking and he says that she will attend most of the cooking schools with him.  He has made many changes since having his event and says that he is watching fats and sodium and has changed from making bread with regular flour to whole wheat for example.  Goals: to learn what foods he should and should not be eating.  Progress: he is willing to make some dietary changes but culturally still enjoys [place] food. 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  He says that his job is the most stressful thing in his life and works a lot of hours.  Goal: to be aware of stress and practice deep breathing.  Progress: he " has already requested that he work less hours at work and is considering retiring from [employer] as well and working less in the future.

## 2018-10-25 NOTE — PROGRESS NOTES
Intensive Cardiac Rehabilitation (ICR) Individual Treatment Plan (ITP) reviewed and signed.  Sincerely,  Dank Escalante MD

## 2018-11-07 ENCOUNTER — TELEPHONE (OUTPATIENT)
Dept: CARDIOLOGY | Facility: MEDICAL CENTER | Age: 53
End: 2018-11-07

## 2018-11-08 NOTE — TELEPHONE ENCOUNTER
Spoke with pt. He saw Jacinto 9-18-18 in hospital [facility] post STEMI.   He is a retired . 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 Generally testing for this job requires pt. Having exercise MPI and pt. Doesn't know if he can/should do this.   Pt. Had appointment 11-15-18 to discuss all this with Jacinto, but appointment was canceled by our .   Per pt., Dec. Is too late.   To Jacinto - can you find any spot to see pt. Before the end of Nov.?

## 2018-11-08 NOTE — TELEPHONE ENCOUNTER
Attempted to call pt to clarify what the requirements of his new job are to determine if we would need to see him prior to clearing him for work. No answer, left voicemail to call back.

## 2018-11-08 NOTE — TELEPHONE ENCOUNTER
----- Message from Augusta Amezquita, Med Ass't sent at 11/7/2018  1:41 PM PST -----  Regarding: RT Follow Up  RT Chuy patient had to be rescheduled from his 11/15 appointment. The patient has received a job offer and is currently awaiting clearance from our office to supply to his potential new employer so he can show he is cleared for work. Patient was seen 9/18  And wants to know if his last visit is sufficient for us to provide his clearance or if he truly needs to come in and be seen again.       Thank you,     Augusta

## 2018-11-12 ENCOUNTER — NON-PROVIDER VISIT (OUTPATIENT)
Dept: CARDIOLOGY | Facility: MEDICAL CENTER | Age: 53
End: 2018-11-12
Payer: COMMERCIAL

## 2018-11-12 DIAGNOSIS — Z95.5 STENTED CORONARY ARTERY: ICD-10-CM

## 2018-11-12 LAB — EKG IMPRESSION: NORMAL

## 2018-11-12 PROCEDURE — G0422 INTENS CARDIAC REHAB W/EXERC: HCPCS | Performed by: FAMILY MEDICINE

## 2018-11-12 PROCEDURE — G0423 INTENS CARDIAC REHAB NO EXER: HCPCS | Mod: 59 | Performed by: FAMILY MEDICINE

## 2018-11-12 ASSESSMENT — PATIENT HEALTH QUESTIONNAIRE - PHQ9: SUM OF ALL RESPONSES TO PHQ QUESTIONS 1-9: 3

## 2018-11-12 NOTE — PROGRESS NOTES
Jayson Mccurdy attended: Exercise Workshop from 9:00-10:00am.       The topic was: Yoga.     Patient received handouts regarding the specific exercise information.

## 2018-11-12 NOTE — PROGRESS NOTES
Cardiac Rehab Individual Treatment Plan Assessment: 30 day 18 Session #     2   MRN: [id number]   Allergies: Patient has no known allergies.   Patient Name: Jayson Mccurdy : 1965 Risk Stratification: High    Diagnoses:   1. Stented coronary artery     Age: 53 y.o. Physician: Kalin Ash M.D.    Date of Event: 18 Specialist: Emily   Risk Factors:  Hypertension, Hyperlipidemia, Family History, Stress, Age, Male > 45   Exercise Nutrition Education Psychosocial   Stages of change Stages of change Stages of change Stages of change   Preparation Preparation Preparation Preparation   Fitness Test Lipids Learning Barriers Outcome Survey Tools   DIST:  (No data)  Available: Yes Learning Barriers: None FP QOL Overall Score: 20.29 (orientation data)   Max HR:  (No data) Date: 18 Family Support PHQ-9: 3 (orientation data)   RPE:  (No data) Total: 130 mg/dL Yes Nutrition Screen: 61 % (orientation data)   SPO2:  (No data) Tri mg/dL Lives: Spouse Intervention   MET:  (No data) HDL: 49 mg/dL Tobacco Use Behavioral Health Consult: Yes   EF= 55 (2018) LDL: 63 mg/dL History   Smoking Status   • Never Smoker   Smokeless Tobacco   • Never Used      Physician Referral: No   Ambulatory Status Diabetes Smoking Intervention Identifies Stressors: Yes   Fall Risk Assessed: Yes Diabetes: No Smoking Cessation Referral: N/A Drug Intervention: No   Exercise Ambulatory Status Assist Devices: None HbA1C: 5.8 % Date: 18 Ind. Education / Counseling: N/A Education   Exercise Prescription Monitors BS at Home: No Tobacco Adjunct: N/A Psychosocial Education: Coping Techniques, Positive Support System, S/S Depression   Mode: Treadmill, NuStep, UBE, Airdyne   Frequency: na Random BS: 104 (2018) Education Intervention Target Goal   Frequency:  3 days/week Weight Management Healthy Heart Education: Class Schedule Given, Medications Reviewed, Patient Education Binder Provided, Risk Factors Discussed,  "Videos Viewed per [employer] Assess presence or absence of depression using a valid screening: Yes   Duration:  30-45 min Weight: 79.6 kg (175 lb 8 oz) Target Goal Use Stress Management: Yes   Intensity:  11-13 RPE Height: 172.7 cm (5' 7.99\") Complete Tobacco Cessation: Complete Tobacco Cessation: N/A Adverse Events: Yes   METS:  3.0-5.0 BMI (Calculated): 26.69 Educate / Review and have understanding of cardiac disease prevention: Educate / Review and have understanding of cardiac disease prevention: Yes Unexpected Events: Yes   Progression:  ^ increments of 1-5 to THR/RPE as tolerated Goal weight: (P) 171.5 Medication Compliance: Yes    THR: THR: Other (see comment) (130-140) History   Alcohol Use   • 0.6 oz/week   • 1 Glasses of wine per week     Comment: once a month          Angina with Exercise?  Angina with Exercise: No      Resistance Training?  Resistance Training: Yes      Hypertension      Diagnosed with HTN: Yes Intervention      Resting BP: (P) 145/55 Dietician Consult/Class: Pending (scheduled)      Peak Ex BP:  (No data) Nurse/Patient Discussion: Yes     Intervention Diabetes Ed Referral: N/A     Home Exercise:  Yes Discuss Maintenance /Wt Loss: Yes     Mode: Walk, Gym Attend Cooking School: Pending (scheduled)     Duration: 30-60 min Dietary Goal: >=61 rate your plate, low sodium     Frequency: 7 days active  Education     Education Nutrition Education: Healthy Eating, Sodium Reduction     Equipment Orientation, Exercise Safety, S/S to Report, RPE Scale, Warm Up / Cool Down, Physically Active Target Goal     Target Goal LDL-C < 100 if trig. > 200:  N/A       Start Individual Exercise Rx Yes LDL-C < 70 for high risk patient:  Yes       BP < 140/90 or < 130/80 if DM or CKD Yes Non HDL-C Should be < 130:  Yes       Aerobic activity 30 + min / day 5 days / wk Yes HbA1C < 7%: Yes         BMI < 25: Yes       Notes:   Today is Jayson VEGA \"Mo\" first day at [facility]. Staff will let Mo get introduced to the Pritikin " diet and learn more about expectations of the ICR program.     Exercise:   Currently Mo says he is walking daily for about 30 minutes.     Goal: Mo would like to gain his endurance and strength back through the program.     Nutrition: Mo says his wife does most of the cooking. Staff explained to Mo briefly the PritiStilnest program and he says he has been watching his sodium, fat and sugars. Since his event Mo has been more aware of healthy eating and switching out white breads for wheat and cooking with little to no oil.     Goal: Mo has a goal to learn about making healthy eating choices when alone and eating out and how to prepare healthy snacks.     Stress:   Mo works security at [employer] full time, he works many long hours and says this can cause him stress with his life. 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 Mo is considering retiring from [employer] to limit his stress further.     Goal: To eliminate stressors that he can control.

## 2018-11-14 ENCOUNTER — OFFICE VISIT (OUTPATIENT)
Dept: CARDIOLOGY | Facility: MEDICAL CENTER | Age: 53
End: 2018-11-14
Payer: COMMERCIAL

## 2018-11-14 VITALS
SYSTOLIC BLOOD PRESSURE: 104 MMHG | RESPIRATION RATE: 14 BRPM | DIASTOLIC BLOOD PRESSURE: 70 MMHG | BODY MASS INDEX: 26.07 KG/M2 | HEART RATE: 56 BPM | HEIGHT: 68 IN | OXYGEN SATURATION: 98 % | WEIGHT: 172 LBS

## 2018-11-14 DIAGNOSIS — I21.3 ST ELEVATION MYOCARDIAL INFARCTION (STEMI), UNSPECIFIED ARTERY (HCC): ICD-10-CM

## 2018-11-14 DIAGNOSIS — I25.110 CORONARY ARTERY DISEASE INVOLVING NATIVE HEART WITH UNSTABLE ANGINA PECTORIS, UNSPECIFIED VESSEL OR LESION TYPE (HCC): ICD-10-CM

## 2018-11-14 PROCEDURE — 99213 OFFICE O/P EST LOW 20 MIN: CPT | Mod: 29 | Performed by: NURSE PRACTITIONER

## 2018-11-14 ASSESSMENT — ENCOUNTER SYMPTOMS
SPUTUM PRODUCTION: 0
VOMITING: 0
PALPITATIONS: 0
NAUSEA: 0
WEAKNESS: 0
ORTHOPNEA: 0
COUGH: 0
CLAUDICATION: 0
PND: 0
DIZZINESS: 0
SHORTNESS OF BREATH: 0
HEMOPTYSIS: 0
WHEEZING: 0

## 2018-11-14 NOTE — PROGRESS NOTES
Chief Complaint   Patient presents with   • Coronary Artery Disease       Subjective:   Jayson Mccurdy is a 53 y.o. male who presents today for CAD    He was seen by Dr. Diaz in the hospital, HX: CAD s/p PCI to mid circumflex 09/2018 for STEMI.     Patient is doing well.  He is a retired  currently working a [employer] as a .    He has had no episodes of chest pain, palpitations, dizziness/lightheadedness, shortness of breath, orthopnea, or peripheral edema.    He is normally very active.    Strong family history of early CAD.     Past Medical History:   Diagnosis Date   • Heart attack (HCC)    • High risk medication use 9/6/2018     History reviewed. No pertinent surgical history.  Family History   Problem Relation Age of Onset   • No Known Problems Mother    • Heart Attack Father 42        cabg   • Hypertension Father    • Heart Disease Father    • Heart Failure Father    • Heart Attack Brother 38   • Heart Attack Paternal Uncle 50     Social History     Social History   • Marital status:      Spouse name: N/A   • Number of children: N/A   • Years of education: N/A     Occupational History   • Not on file.     Social History Main Topics   • Smoking status: Never Smoker   • Smokeless tobacco: Never Used   • Alcohol use 0.6 oz/week     1 Glasses of wine per week      Comment: once a month    • Drug use: No   • Sexual activity: Not on file     Other Topics Concern   • Not on file     Social History Narrative   • No narrative on file     No Known Allergies  Outpatient Encounter Prescriptions as of 11/14/2018   Medication Sig Dispense Refill   • carvedilol (COREG) 6.25 MG Tab Take 1 Tab by mouth 2 times a day, with meals. 180 Tab 3   • losartan (COZAAR) 25 MG Tab Take 1 Tab by mouth every day. 90 Tab 3   • prasugrel (EFFIENT) 10 MG Tab Take 1 Tab by mouth every day. 90 Tab 3   • rosuvastatin (CRESTOR) 40 MG tablet Take 1 Tab by mouth every evening. 90 Tab 3   •  "nitroglycerin (NITROSTAT) 0.4 MG SL Tab Place 1 Tab under tongue as needed for Chest Pain. 25 Tab 2   • aspirin EC 81 MG EC tablet Take 1 Tab by mouth every day. 100 Tab 1     No facility-administered encounter medications on file as of 11/14/2018.      Review of Systems   Constitutional: Negative for malaise/fatigue.   Respiratory: Negative for cough, hemoptysis, sputum production, shortness of breath and wheezing.    Cardiovascular: Negative for chest pain, palpitations, orthopnea, claudication, leg swelling and PND.   Gastrointestinal: Negative for nausea and vomiting.   Neurological: Negative for dizziness and weakness.        Objective:   /70 (BP Location: Left arm, Patient Position: Sitting, BP Cuff Size: Adult)   Pulse (!) 56   Resp 14   Ht 1.727 m (5' 8\")   Wt 78 kg (172 lb)   SpO2 98%   BMI 26.15 kg/m²     Physical Exam   Constitutional: He is oriented to person, place, and time. He appears well-developed and well-nourished. No distress.   HENT:   Head: Normocephalic and atraumatic.   Eyes: Pupils are equal, round, and reactive to light.   Neck: No JVD present.   Cardiovascular: Regular rhythm and normal heart sounds.  Bradycardia present.    Pulmonary/Chest: Effort normal and breath sounds normal.   Abdominal: Soft. Bowel sounds are normal. He exhibits no distension.   Musculoskeletal: He exhibits no edema.   Neurological: He is alert and oriented to person, place, and time.   Skin: Skin is warm and dry. He is not diaphoretic.   Psychiatric: He has a normal mood and affect. His behavior is normal. Judgment and thought content normal.       Echocardiography Laboratory  9/7/18  Technically difficult study - adequate information is obtained.   Left ventricular ejection fraction is visually estimated to be 55%.  Distal lateralwall not well visualized,but is probably mildly hypokinetic.  Remander of LV wallotion is normal.  The left atrium is normal in size.  Structurally normal mitral valve without " significant stenosis or regurgitation.  Structurally normal aortic valve without significant stenosis or regurgitation.  Normal pericardium without effusion.    Cardiac catheterization   9/6/18  Findings: 100% mid Circ stented with good result.  Moderate stenosis in prox LAD and Mid RCA  EF 45% with distal inferior wall and apical akinesis    Assessment:     1. Coronary artery disease involving native heart with unstable angina pectoris, unspecified vessel or lesion type ([facility])     2. ST elevation myocardial infarction (STEMI), unspecified artery ([facility])         Medical Decision Making:  Today's Assessment / Status / Plan:     1. CAD:  - Pt recovering well post stent placement.  He has not had any recurrence of chest pain or angina.  - EKG today showed SB, no afib   - He has been compliant with his medications without missed doses.  I discussed mechanism of action and importance of cardiac medications, especially DAPT.    - He is participating in Cardiac Rehab   - continue to take aspirin 81 mg, carvedilol 6.25 mg twice daily, losartan to 25 mg daily, Crestor 40 mg daily, Effient 10 mg daily  -has not needed to take nitroglycerin - ECHO post PCI echocardiogram showed an ejection fraction of 55%    Since he is retired , and planning to return to work with [employer]. Patient can return back to work in full duty.     Follow up in 6 months with Dr. Diaz, sooner as needed.     Collaborating Provider: Dr. Vargas

## 2018-11-15 ENCOUNTER — PATIENT MESSAGE (OUTPATIENT)
Dept: CARDIOLOGY | Facility: MEDICAL CENTER | Age: 53
End: 2018-11-15

## 2018-11-15 DIAGNOSIS — I21.3 ST ELEVATION MYOCARDIAL INFARCTION (STEMI), UNSPECIFIED ARTERY (HCC): ICD-10-CM

## 2018-11-15 DIAGNOSIS — I25.10 CORONARY ARTERY DISEASE INVOLVING NATIVE CORONARY ARTERY OF NATIVE HEART WITHOUT ANGINA PECTORIS: ICD-10-CM

## 2018-11-16 ENCOUNTER — NON-PROVIDER VISIT (OUTPATIENT)
Dept: CARDIOLOGY | Facility: MEDICAL CENTER | Age: 53
End: 2018-11-16
Payer: COMMERCIAL

## 2018-11-16 DIAGNOSIS — Z95.5 STENTED CORONARY ARTERY: ICD-10-CM

## 2018-11-16 LAB — EKG IMPRESSION: NORMAL

## 2018-11-16 PROCEDURE — G0422 INTENS CARDIAC REHAB W/EXERC: HCPCS | Performed by: FAMILY MEDICINE

## 2018-11-16 PROCEDURE — G0423 INTENS CARDIAC REHAB NO EXER: HCPCS | Mod: 59 | Performed by: FAMILY MEDICINE

## 2018-11-16 NOTE — PROGRESS NOTES
Jayson Mccurdy attended: cooking school from  9:00-10:00am.   Today  prepared Oatmeal Buffet Bar.     Patient received handouts and nutrition information regarding the specific recipes.

## 2018-11-16 NOTE — PATIENT COMMUNICATION
Per [employer]: okay to schedule Treadmill.  Spoke with pt. 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 Dcheduled treadmill on Mon. 11-19-18.

## 2018-11-19 ENCOUNTER — NON-PROVIDER VISIT (OUTPATIENT)
Dept: CARDIOLOGY | Facility: MEDICAL CENTER | Age: 53
End: 2018-11-19
Payer: COMMERCIAL

## 2018-11-19 VITALS
WEIGHT: 172 LBS | BODY MASS INDEX: 26.07 KG/M2 | HEIGHT: 68 IN | HEART RATE: 55 BPM | OXYGEN SATURATION: 98 % | SYSTOLIC BLOOD PRESSURE: 110 MMHG | DIASTOLIC BLOOD PRESSURE: 70 MMHG

## 2018-11-19 DIAGNOSIS — Z95.5 STENTED CORONARY ARTERY: ICD-10-CM

## 2018-11-19 DIAGNOSIS — I25.10 CORONARY ARTERY DISEASE INVOLVING NATIVE CORONARY ARTERY OF NATIVE HEART WITHOUT ANGINA PECTORIS: ICD-10-CM

## 2018-11-19 DIAGNOSIS — I21.3 ST ELEVATION MYOCARDIAL INFARCTION (STEMI), UNSPECIFIED ARTERY (HCC): ICD-10-CM

## 2018-11-19 LAB
EKG IMPRESSION: NORMAL
TREADMILL STRESS: NORMAL

## 2018-11-19 PROCEDURE — G0422 INTENS CARDIAC REHAB W/EXERC: HCPCS | Performed by: FAMILY MEDICINE

## 2018-11-19 PROCEDURE — 93015 CV STRESS TEST SUPVJ I&R: CPT | Performed by: INTERNAL MEDICINE

## 2018-11-19 PROCEDURE — G0423 INTENS CARDIAC REHAB NO EXER: HCPCS | Mod: 59 | Performed by: FAMILY MEDICINE

## 2018-11-20 ENCOUNTER — PATIENT MESSAGE (OUTPATIENT)
Dept: CARDIOLOGY | Facility: MEDICAL CENTER | Age: 53
End: 2018-11-20

## 2018-11-20 NOTE — PATIENT COMMUNICATION
To [employer]. Please review exercise treadmill results and write a letter clearing pt. To work.  He will await nurse's call on Wed. To  letter and treadmill results.

## 2018-11-21 ENCOUNTER — NON-PROVIDER VISIT (OUTPATIENT)
Dept: CARDIOLOGY | Facility: MEDICAL CENTER | Age: 53
End: 2018-11-21
Payer: COMMERCIAL

## 2018-11-21 ENCOUNTER — TELEPHONE (OUTPATIENT)
Dept: CARDIOLOGY | Facility: MEDICAL CENTER | Age: 53
End: 2018-11-21

## 2018-11-21 DIAGNOSIS — Z95.5 STENTED CORONARY ARTERY: ICD-10-CM

## 2018-11-21 LAB — EKG IMPRESSION: NORMAL

## 2018-11-21 PROCEDURE — G0423 INTENS CARDIAC REHAB NO EXER: HCPCS | Mod: 59 | Performed by: FAMILY MEDICINE

## 2018-11-21 PROCEDURE — G0422 INTENS CARDIAC REHAB W/EXERC: HCPCS | Performed by: FAMILY MEDICINE

## 2018-11-21 NOTE — TELEPHONE ENCOUNTER
[facility] Treadmill Stress   Order: [id number]   Status:  Edited Result - FINAL   Visible to patient:  Yes (Norbert) Dx:  ST elevation myocardial infarction (S...   Notes recorded by JAGDISH Solis on 11/21/2018 at 7:52 AM PST  Treadmill stress test is good,    Can you please compose letter for state [employer] for the patient regarding negative EKG stress test for ischemia at high workload.  Good exercise capacity.     Thank you,  RT     Letter completed. My chart message sent to patient to inform him.

## 2018-11-26 ENCOUNTER — NON-PROVIDER VISIT (OUTPATIENT)
Dept: CARDIOLOGY | Facility: MEDICAL CENTER | Age: 53
End: 2018-11-26
Payer: COMMERCIAL

## 2018-11-26 DIAGNOSIS — Z95.5 STENTED CORONARY ARTERY: ICD-10-CM

## 2018-11-26 LAB — EKG IMPRESSION: NORMAL

## 2018-11-26 PROCEDURE — G0423 INTENS CARDIAC REHAB NO EXER: HCPCS | Mod: 59 | Performed by: FAMILY MEDICINE

## 2018-11-26 PROCEDURE — G0422 INTENS CARDIAC REHAB W/EXERC: HCPCS | Performed by: FAMILY MEDICINE

## 2018-11-26 NOTE — PROGRESS NOTES
Jayson Mccurdy attended the following workshop from 9:00-10:00am.   Workshop Title: Label Reading.         Lecture was attended and patient questions addressed. The patient will continue workshops and nutrition education.

## 2018-11-28 ENCOUNTER — NON-PROVIDER VISIT (OUTPATIENT)
Dept: CARDIOLOGY | Facility: MEDICAL CENTER | Age: 53
End: 2018-11-28
Payer: COMMERCIAL

## 2018-11-28 DIAGNOSIS — Z95.5 STENTED CORONARY ARTERY: ICD-10-CM

## 2018-11-28 LAB — EKG IMPRESSION: NORMAL

## 2018-11-28 PROCEDURE — G0422 INTENS CARDIAC REHAB W/EXERC: HCPCS | Performed by: FAMILY MEDICINE

## 2018-11-28 PROCEDURE — G0423 INTENS CARDIAC REHAB NO EXER: HCPCS | Mod: 59 | Performed by: FAMILY MEDICINE

## 2018-11-30 ENCOUNTER — NON-PROVIDER VISIT (OUTPATIENT)
Dept: CARDIOLOGY | Facility: MEDICAL CENTER | Age: 53
End: 2018-11-30
Payer: COMMERCIAL

## 2018-11-30 DIAGNOSIS — Z95.5 STENTED CORONARY ARTERY: ICD-10-CM

## 2018-11-30 LAB — EKG IMPRESSION: NORMAL

## 2018-11-30 PROCEDURE — G0423 INTENS CARDIAC REHAB NO EXER: HCPCS | Mod: 59 | Performed by: FAMILY MEDICINE

## 2018-11-30 PROCEDURE — G0422 INTENS CARDIAC REHAB W/EXERC: HCPCS | Performed by: FAMILY MEDICINE

## 2018-11-30 NOTE — PROGRESS NOTES
Jayson Mccurdy attended: cooking school from 9:00-10:00am.   Today  prepared Minestrone.     Patient received handouts and nutrition information regarding the specific recipes.

## 2018-12-03 ENCOUNTER — NON-PROVIDER VISIT (OUTPATIENT)
Dept: CARDIOLOGY | Facility: MEDICAL CENTER | Age: 53
End: 2018-12-03
Payer: COMMERCIAL

## 2018-12-03 DIAGNOSIS — Z95.5 STENTED CORONARY ARTERY: ICD-10-CM

## 2018-12-03 LAB — EKG IMPRESSION: NORMAL

## 2018-12-03 PROCEDURE — G0422 INTENS CARDIAC REHAB W/EXERC: HCPCS | Performed by: FAMILY MEDICINE

## 2018-12-03 PROCEDURE — G0423 INTENS CARDIAC REHAB NO EXER: HCPCS | Mod: 59 | Performed by: FAMILY MEDICINE

## 2018-12-03 ASSESSMENT — PATIENT HEALTH QUESTIONNAIRE - PHQ9: SUM OF ALL RESPONSES TO PHQ QUESTIONS 1-9: 3

## 2018-12-03 NOTE — PROGRESS NOTES
Jayson Mccurdy attended: Healthy Mindset Workshop from 9:00-10:00am.      The topic was: Stress and Your Health.    Patient received handouts regarding the specific exercise information.

## 2018-12-07 ENCOUNTER — NON-PROVIDER VISIT (OUTPATIENT)
Dept: CARDIOLOGY | Facility: MEDICAL CENTER | Age: 53
End: 2018-12-07
Payer: COMMERCIAL

## 2018-12-07 DIAGNOSIS — Z95.5 STENTED CORONARY ARTERY: ICD-10-CM

## 2018-12-07 LAB — EKG IMPRESSION: NORMAL

## 2018-12-07 PROCEDURE — G0423 INTENS CARDIAC REHAB NO EXER: HCPCS | Mod: 59 | Performed by: FAMILY MEDICINE

## 2018-12-07 PROCEDURE — G0422 INTENS CARDIAC REHAB W/EXERC: HCPCS | Performed by: FAMILY MEDICINE

## 2018-12-07 NOTE — PROGRESS NOTES
Jayson Mccurdy attended: cooking school from 9:00-10:00am.   Today  prepared Christa Masala.     Patient received handouts and nutrition information regarding the specific recipes.

## 2018-12-10 ENCOUNTER — NON-PROVIDER VISIT (OUTPATIENT)
Dept: CARDIOLOGY | Facility: MEDICAL CENTER | Age: 53
End: 2018-12-10
Payer: COMMERCIAL

## 2018-12-10 DIAGNOSIS — Z95.5 STENTED CORONARY ARTERY: ICD-10-CM

## 2018-12-10 PROCEDURE — G0422 INTENS CARDIAC REHAB W/EXERC: HCPCS | Performed by: FAMILY MEDICINE

## 2018-12-10 PROCEDURE — G0423 INTENS CARDIAC REHAB NO EXER: HCPCS | Mod: 59 | Performed by: FAMILY MEDICINE

## 2018-12-10 NOTE — PROGRESS NOTES
Jayson Mccurdy attended: Exercise Workshop from 9:00-10:00am.      The topic was: Improving Performance.     Patient received handouts regarding the specific exercise information

## 2018-12-11 LAB — EKG IMPRESSION: NORMAL

## 2018-12-12 ENCOUNTER — NON-PROVIDER VISIT (OUTPATIENT)
Dept: CARDIOLOGY | Facility: MEDICAL CENTER | Age: 53
End: 2018-12-12
Payer: COMMERCIAL

## 2018-12-12 DIAGNOSIS — Z95.5 STENTED CORONARY ARTERY: ICD-10-CM

## 2018-12-12 LAB — EKG IMPRESSION: NORMAL

## 2018-12-12 PROCEDURE — G0423 INTENS CARDIAC REHAB NO EXER: HCPCS | Mod: 59 | Performed by: FAMILY MEDICINE

## 2018-12-12 PROCEDURE — G0422 INTENS CARDIAC REHAB W/EXERC: HCPCS | Performed by: FAMILY MEDICINE

## 2018-12-14 ENCOUNTER — NON-PROVIDER VISIT (OUTPATIENT)
Dept: CARDIOLOGY | Facility: MEDICAL CENTER | Age: 53
End: 2018-12-14
Payer: COMMERCIAL

## 2018-12-14 DIAGNOSIS — Z95.5 STENTED CORONARY ARTERY: ICD-10-CM

## 2018-12-14 LAB — EKG IMPRESSION: NORMAL

## 2018-12-14 PROCEDURE — G0422 INTENS CARDIAC REHAB W/EXERC: HCPCS | Performed by: FAMILY MEDICINE

## 2018-12-14 PROCEDURE — G0423 INTENS CARDIAC REHAB NO EXER: HCPCS | Mod: 59 | Performed by: FAMILY MEDICINE

## 2018-12-14 NOTE — PROGRESS NOTES
Jayson Mccurdy attended: cooking school from 9:00-10:00am.   Today  prepared Sloppy Tacos.    Patient received handouts and nutrition information regarding the specific recipes.

## 2018-12-19 ENCOUNTER — NON-PROVIDER VISIT (OUTPATIENT)
Dept: CARDIOLOGY | Facility: MEDICAL CENTER | Age: 53
End: 2018-12-19
Payer: COMMERCIAL

## 2018-12-19 DIAGNOSIS — Z95.5 STENTED CORONARY ARTERY: ICD-10-CM

## 2018-12-19 LAB — EKG IMPRESSION: NORMAL

## 2018-12-19 PROCEDURE — G0422 INTENS CARDIAC REHAB W/EXERC: HCPCS | Performed by: FAMILY MEDICINE

## 2018-12-21 ENCOUNTER — NON-PROVIDER VISIT (OUTPATIENT)
Dept: CARDIOLOGY | Facility: MEDICAL CENTER | Age: 53
End: 2018-12-21
Payer: COMMERCIAL

## 2018-12-21 DIAGNOSIS — Z95.5 STENTED CORONARY ARTERY: ICD-10-CM

## 2018-12-21 LAB — EKG IMPRESSION: NORMAL

## 2018-12-21 PROCEDURE — G0423 INTENS CARDIAC REHAB NO EXER: HCPCS | Mod: 59 | Performed by: FAMILY MEDICINE

## 2018-12-21 PROCEDURE — G0422 INTENS CARDIAC REHAB W/EXERC: HCPCS | Performed by: FAMILY MEDICINE

## 2018-12-21 NOTE — PROGRESS NOTES
Jayson Mccurdy attended: cooking school from 9:00-10:00am.   Today  prepared both Hummus and Berry Ice cream .    Patient received handouts and nutrition information regarding the specific recipes.

## 2018-12-24 ENCOUNTER — NON-PROVIDER VISIT (OUTPATIENT)
Dept: CARDIOLOGY | Facility: MEDICAL CENTER | Age: 53
End: 2018-12-24
Payer: COMMERCIAL

## 2018-12-24 DIAGNOSIS — Z95.5 STENTED CORONARY ARTERY: ICD-10-CM

## 2018-12-24 LAB — EKG IMPRESSION: NORMAL

## 2018-12-24 PROCEDURE — G0423 INTENS CARDIAC REHAB NO EXER: HCPCS | Mod: 59 | Performed by: FAMILY MEDICINE

## 2018-12-24 PROCEDURE — G0422 INTENS CARDIAC REHAB W/EXERC: HCPCS | Performed by: FAMILY MEDICINE

## 2018-12-24 ASSESSMENT — PATIENT HEALTH QUESTIONNAIRE - PHQ9: SUM OF ALL RESPONSES TO PHQ QUESTIONS 1-9: 3

## 2018-12-24 NOTE — PROGRESS NOTES
Cardiac Rehab Individual Treatment Plan Assessment: (P) 90 day 18 Session #     (C) 91   MRN: [id number]   Allergies: Patient has no known allergies.   Patient Name: Jayson Mccurdy : 1965 Risk Stratification: (P) High    Diagnoses:   1. Stented coronary artery     Age: 53 y.o. Physician: Kalin Ash M.D.    Date of Event: (P) 18 Specialist: (PSeven Diaz   Risk Factors:  (P) Hypertension, Hyperlipidemia, Family History, Stress, Age, Male > 45   Exercise Nutrition Education Psychosocial   Stages of change Stages of change Stages of change Stages of change   (P) Preparation (P) Preparation (P) Preparation (P) Preparation   Fitness Test Lipids Learning Barriers Outcome Survey Tools   DIST: (P)  (No data)  Available: (P) Yes Learning Barriers: (P) None FP QOL Overall Score: (P) 20.29 (orientation data)   Max HR: (P)  (No data) Date: (P) 18 Family Support PHQ-9: (P) 3 (orientation data)   RPE: (P)  (No data) Total: (P) 130 mg/dL (P) Yes Nutrition Screen: (P) 61 % (orientation data)   SPO2: (P)  (No data) Trig: (P) 90 mg/dL Lives: (P) Spouse Intervention   MET: (P)  (No data) HDL: (P) 49 mg/dL Tobacco Use Behavioral Health Consult: (P) Yes   EF= (P) 55 (2018) LDL: (P) 63 mg/dL History   Smoking Status   • Never Smoker   Smokeless Tobacco   • Never Used      Physician Referral: (P) No   Ambulatory Status Diabetes Smoking Intervention Identifies Stressors: (P) Yes   Fall Risk Assessed: (P) Yes Diabetes: (P) No Smoking Cessation Referral: (P) N/A Drug Intervention: (P) No   Exercise Ambulatory Status Assist Devices: (P) None HbA1C: (P) 5.8 % Date: (P) 18 Ind. Education / Counseling: (P) N/A Education   Exercise Prescription Monitors BS at Home: (P) No Tobacco Adjunct: (P) N/A Psychosocial Education: (P) Coping Techniques, Positive Support System, S/S Depression   Mode: (P) Treadmill, NuStep, UBE, Airdyne   Frequency: (P) na Random BS: (P) 104 (2018) Education Intervention Target  "Goal   Frequency:  (P) 3 days/week Weight Management Healthy Heart Education: (P) Class Schedule Given, Medications Reviewed, Patient Education Binder Provided, Risk Factors Discussed, Videos Viewed per Devin Assess presence or absence of depression using a valid screening: (P) Yes   Duration:  (P) 30-45 min Weight: (P) 77.6 kg (171 lb) Target Goal Use Stress Management: (P) Yes   Intensity:  (P) 11-13 RPE Height: (P) 172.7 cm (5' 7.99\") Complete Tobacco Cessation: Complete Tobacco Cessation: (P) N/A Adverse Events: (P) No   METS:  (P) 7.0-9.0 BMI (Calculated): (P) 26.01 Educate / Review and have understanding of cardiac disease prevention: Educate / Review and have understanding of cardiac disease prevention: (P) Yes Unexpected Events: (P) No   Progression:  (P) ^ increments of 1-5 to THR/RPE as tolerated Goal weight: (P) 171.5 Medication Compliance: (P) Yes    THR: THR: (P) Other (see comment) (130-140) History   Alcohol Use   • 0.6 oz/week   • 1 Glasses of wine per week     Comment: once a month          Angina with Exercise?  Angina with Exercise: (P) No      Resistance Training?  Resistance Training: (P) Yes      Hypertension      Diagnosed with HTN: (P) Yes Intervention      Resting BP: (P) 117/68 Dietician Consult/Class: (P) Pending (scheduled)      Peak Ex BP: (P)  (No data) Nurse/Patient Discussion: (P) Yes     Intervention Diabetes Ed Referral: (P) N/A     Home Exercise:  (P) Yes Discuss Maintenance /Wt Loss: (P) Yes     Mode: (P) Walk, Gym Attend Cooking School: (P) Yes (scheduled)     Duration: (P) 30-60 min Dietary Goal: (P) >=61 rate your plate, low sodium     Frequency: (P) 7 days active  Education     Education Nutrition Education: (P) Healthy Eating, Sodium Reduction     (P) Equipment Orientation, Exercise Safety, S/S to Report, RPE Scale, Warm Up / Cool Down, Physically Active Target Goal     Target Goal LDL-C < 100 if trig. > 200:  (P) N/A       Start Individual Exercise Rx (P) Yes LDL-C < 70 " for high risk patient:  (P) Yes       BP < 140/90 or < 130/80 if DM or CKD (P) Yes Non HDL-C Should be < 130:  (P) Yes       Aerobic activity 30 + min / day 5 days / wk (P) Yes HbA1C < 7%: (P) Yes         BMI < 25: (P) Yes       Notes:  Mo is on his 16th session today.      Exercise:   While Mo is at [facility] he completes 1 exercise for 30 minutes, plus an advanced weight routine. On his off days MO is very active daily. 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 Mo admits that he needs to go back to the gym and do more purposeful exercise. Mo is actively moving right now and says that he will definitely go back to the gym after he moves.     New/Same goal: Mo has a goal to go back to the gym once he gets all moved in after the New Year. In the meantime Mo says he will continue to walk daily.     Nutrition:   Mo says the biggest change to his diet recently is drinking more water. Mo says he has been eating a balanced diet of veggies, fruits and lean proteins.     Previous goal: Mo has a goal to drink more water on the days he isn't at [facility]. Mo will drink a glass of water before every meal and snack. Mo says this goal has been reached.     Stress:   Mo says the only stress he has right now is the stress of moving. Mo says he will be happy once he is all moved in. To help alleviate stress Mo keeps coming to [facility] and eating healthy. Taking care of his healthy helps keep Mo feeling positive and able to handle life's stressors.     New goal: Mo has a goal to continue to come to [facility] and continue to stay on his healthy routine.

## 2018-12-28 ENCOUNTER — NON-PROVIDER VISIT (OUTPATIENT)
Dept: CARDIOLOGY | Facility: MEDICAL CENTER | Age: 53
End: 2018-12-28
Payer: COMMERCIAL

## 2018-12-28 DIAGNOSIS — Z95.5 STENTED CORONARY ARTERY: ICD-10-CM

## 2018-12-28 LAB — EKG IMPRESSION: NORMAL

## 2018-12-28 PROCEDURE — G0422 INTENS CARDIAC REHAB W/EXERC: HCPCS | Performed by: FAMILY MEDICINE

## 2018-12-28 PROCEDURE — G0423 INTENS CARDIAC REHAB NO EXER: HCPCS | Mod: 59 | Performed by: FAMILY MEDICINE

## 2018-12-28 NOTE — PROGRESS NOTES
Jayson Mccurdy attended: cooking school from 9:00-10:00am.   Today  prepared Pumpkin Pancakes.    Patient received handouts and nutrition information regarding the specific recipes.

## 2018-12-31 ENCOUNTER — NON-PROVIDER VISIT (OUTPATIENT)
Dept: CARDIOLOGY | Facility: MEDICAL CENTER | Age: 53
End: 2018-12-31
Payer: COMMERCIAL

## 2018-12-31 DIAGNOSIS — Z95.5 STENTED CORONARY ARTERY: ICD-10-CM

## 2018-12-31 LAB — EKG IMPRESSION: NORMAL

## 2018-12-31 PROCEDURE — G0422 INTENS CARDIAC REHAB W/EXERC: HCPCS | Performed by: FAMILY MEDICINE

## 2018-12-31 PROCEDURE — G0423 INTENS CARDIAC REHAB NO EXER: HCPCS | Mod: 59 | Performed by: FAMILY MEDICINE

## 2018-12-31 NOTE — PROGRESS NOTES
Jayson Mccurdy attended: Healthy Mindset Workshop from 9:00-10:00     The topic was: Stress Management.     Patient received handouts regarding the specific exercise information

## 2019-01-11 ENCOUNTER — NON-PROVIDER VISIT (OUTPATIENT)
Dept: CARDIOLOGY | Facility: MEDICAL CENTER | Age: 54
End: 2019-01-11
Payer: COMMERCIAL

## 2019-01-11 DIAGNOSIS — Z95.5 STENTED CORONARY ARTERY: ICD-10-CM

## 2019-01-11 LAB — EKG IMPRESSION: NORMAL

## 2019-01-11 PROCEDURE — G0423 INTENS CARDIAC REHAB NO EXER: HCPCS | Mod: 59 | Performed by: FAMILY MEDICINE

## 2019-01-11 PROCEDURE — G0422 INTENS CARDIAC REHAB W/EXERC: HCPCS | Performed by: FAMILY MEDICINE

## 2019-01-11 NOTE — PROGRESS NOTES
Jasyon Mccurdy attended: cooking school from  9:00-10:00am.   Today  prepared Vegetarian Chili.    Patient received handouts and nutrition information regarding the specific recipes.

## 2019-01-14 ENCOUNTER — NON-PROVIDER VISIT (OUTPATIENT)
Dept: CARDIOLOGY | Facility: MEDICAL CENTER | Age: 54
End: 2019-01-14
Payer: COMMERCIAL

## 2019-01-14 DIAGNOSIS — Z95.5 STENTED CORONARY ARTERY: ICD-10-CM

## 2019-01-14 LAB — EKG IMPRESSION: NORMAL

## 2019-01-14 PROCEDURE — G0422 INTENS CARDIAC REHAB W/EXERC: HCPCS | Performed by: FAMILY MEDICINE

## 2019-01-14 PROCEDURE — G0423 INTENS CARDIAC REHAB NO EXER: HCPCS | Mod: 59 | Performed by: FAMILY MEDICINE

## 2019-01-14 NOTE — PROGRESS NOTES
Jayson Mccurdy attended: Exercise Workshop from 9:00-10:00 am.    The topic was: Biomechanical Limitations.     Patient received handouts regarding the specific exercise information.

## 2019-01-18 ENCOUNTER — NON-PROVIDER VISIT (OUTPATIENT)
Dept: CARDIOLOGY | Facility: MEDICAL CENTER | Age: 54
End: 2019-01-18
Payer: COMMERCIAL

## 2019-01-18 DIAGNOSIS — Z95.5 STENTED CORONARY ARTERY: ICD-10-CM

## 2019-01-18 LAB — EKG IMPRESSION: NORMAL

## 2019-01-18 PROCEDURE — G0423 INTENS CARDIAC REHAB NO EXER: HCPCS | Mod: 59 | Performed by: FAMILY MEDICINE

## 2019-01-18 PROCEDURE — G0422 INTENS CARDIAC REHAB W/EXERC: HCPCS | Performed by: FAMILY MEDICINE

## 2019-01-18 ASSESSMENT — PATIENT HEALTH QUESTIONNAIRE - PHQ9: SUM OF ALL RESPONSES TO PHQ QUESTIONS 1-9: 3

## 2019-01-18 NOTE — PROGRESS NOTES
Jayson Mccurdy attended: cooking school from 9:00-10:00am.   Today  prepared Oatmeal and Parfait.    Patient received handouts and nutrition information regarding the specific recipes.

## 2019-01-18 NOTE — PROGRESS NOTES
Cardiac Rehab Individual Treatment Plan Assessment: Other (see comment) (120 Day ITP) 19 Session #     21   MRN: [id number]   Allergies: Patient has no known allergies.   Patient Name: Jayson Mccurdy : 1965 Risk Stratification: High    Diagnoses:   1. Stented coronary artery     Age: 53 y.o. Physician: Kalin Ash M.D.    Date of Event: 18 Specialist: Emily   Risk Factors:  Hypertension, Hyperlipidemia, Family History, Stress, Age, Male > 45   Exercise Nutrition Education Psychosocial   Stages of change Stages of change Stages of change Stages of change   Preparation Preparation Preparation Preparation   Fitness Test Lipids Learning Barriers Outcome Survey Tools   DIST:  (No data)  Available: Yes Learning Barriers: None FP QOL Overall Score: 20.29 (orientation data)   Max HR:  (No data) Date: 18 Family Support PHQ-9: 3 (orientation data)   RPE:  (No data) Total: 130 mg/dL Yes Nutrition Screen: 61 % (orientation data)   SPO2:  (No data) Tri mg/dL Lives: Spouse Intervention   MET:  (No data) HDL: 49 mg/dL Tobacco Use Behavioral Health Consult: Yes   EF= 55 (2018) LDL: 63 mg/dL History   Smoking Status   • Never Smoker   Smokeless Tobacco   • Never Used      Physician Referral: No   Ambulatory Status Diabetes Smoking Intervention Identifies Stressors: Yes   Fall Risk Assessed: Yes Diabetes: No Smoking Cessation Referral: N/A Drug Intervention: No   Exercise Ambulatory Status Assist Devices: None HbA1C: 5.8 % Date: 18 Ind. Education / Counseling: N/A Education   Exercise Prescription Monitors BS at Home: No Tobacco Adjunct: N/A Psychosocial Education: Coping Techniques, Positive Support System, S/S Depression   Mode: Treadmill, NuStep, UBE, Airdyne   Frequency: na Random BS: 104 (2018) Education Intervention Target Goal   Frequency:  3 days/week Weight Management Healthy Heart Education: Class Schedule Given, Medications Reviewed, Patient Education Binder Provided,  "Risk Factors Discussed, Videos Viewed per Devin Assess presence or absence of depression using a valid screening: Yes   Duration:  30-45 min Weight: 78.9 kg (174 lb) Target Goal Use Stress Management: Yes   Intensity:  11-13 RPE Height: 172.7 cm (5' 7.99\") Complete Tobacco Cessation: Complete Tobacco Cessation: N/A Adverse Events: No   METS:  7.0-9.0 BMI (Calculated): 26.46 Educate / Review and have understanding of cardiac disease prevention: Educate / Review and have understanding of cardiac disease prevention: Yes Unexpected Events: No   Progression:  ^ increments of 1-5 to THR/RPE as tolerated Goal weight: 171.5 Medication Compliance: Yes    THR: THR: Other (see comment) (130-140) History   Alcohol Use   • 0.6 oz/week   • 1 Glasses of wine per week     Comment: once a month          Angina with Exercise?  Angina with Exercise: No      Resistance Training?  Resistance Training: Yes      Hypertension      Diagnosed with HTN: Yes Intervention      Resting BP: 96/58 Dietician Consult/Class: (P) Yes      Peak Ex BP:  (No data) Nurse/Patient Discussion: Yes     Intervention Diabetes Ed Referral: N/A     Home Exercise:  Yes Discuss Maintenance /Wt Loss: Yes     Mode: Walk, Gym Attend Cooking School: (P) Yes     Duration: 30-60 min Dietary Goal: >=61 rate your plate, low sodium     Frequency: 7 days active  Education     Education Nutrition Education: Healthy Eating, Sodium Reduction     Equipment Orientation, Exercise Safety, S/S to Report, RPE Scale, Warm Up / Cool Down, Physically Active Target Goal     Target Goal LDL-C < 100 if trig. > 200:  N/A       Start Individual Exercise Rx Yes LDL-C < 70 for high risk patient:  Yes       BP < 140/90 or < 130/80 if DM or CKD Yes Non HDL-C Should be < 130:  Yes       Aerobic activity 30 + min / day 5 days / wk Yes HbA1C < 7%: Yes         BMI < 25: Yes       Notes:   Mo is on his 21st session today.      Exercise:   While Mo is at [facility] he completes 1 exercise for 30 minutes, " "plus an advanced weight routine. On his off days MO is very active daily. He is currently cleaning and unpacking his house. Mo has been very busy the last two months getting one house ready to sell and moving into his new house. 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 Mo says he is a \"gym maureen\" usually when he isn't moving. \"I would go to the gym 5-7 days a week before all this moving business.\"  Mo admits he knows he need to do more purposeful exercise once he is done moving. Right now going to the gym on his days off would add more stress to Mo's life. I am so busy preparing one house to sell and moving into my new house that I am utilizing any spare time doing that.     New/Same goal: Mo has a goal to go back to the gym once he gets all moved in.   Progress: In the meantime Mo says he will continue to walk daily.     Nutrition:   Mo says he has continued to eat a balanced diet of veggies, fruits and lean proteins. Mo says he feels pretty good with his diet and would say that his dietary goals are met.    Previous goals: Mo had a goals to drink more water on the days he isn't at [facility].  Mo will drink a glass of water before every meal and snack.     Progress: Mo says this goal has been reached. Mo plans his meals and continues to follow the Pritikin diet.     Stress:   Mo continuous to have a little stress right now with moving. Mo says this is the only stress he has right now. Mo says he is looking forward to getting on his usual routine. MO is grateful for [facility], he says it alleviates stress.  New goal: Mo has a goal to continue to come to [facility] and continue to stay on his healthy routine.     Progress: Taking care of his health helps keep Mo feeling positive and able to handle life's stressors.                                                                                  "

## 2019-01-25 ENCOUNTER — NON-PROVIDER VISIT (OUTPATIENT)
Dept: CARDIOLOGY | Facility: MEDICAL CENTER | Age: 54
End: 2019-01-25
Payer: COMMERCIAL

## 2019-01-25 DIAGNOSIS — Z95.5 STENTED CORONARY ARTERY: ICD-10-CM

## 2019-01-25 LAB — EKG IMPRESSION: NORMAL

## 2019-01-25 PROCEDURE — G0422 INTENS CARDIAC REHAB W/EXERC: HCPCS | Performed by: FAMILY MEDICINE

## 2019-01-25 PROCEDURE — G0423 INTENS CARDIAC REHAB NO EXER: HCPCS | Mod: 59 | Performed by: FAMILY MEDICINE

## 2019-01-25 NOTE — PROGRESS NOTES
Jayson Mccurdy attended: cooking school from  9:00-10:00am.   Today  prepared Turkey and Veggie Wraps with White Hu Spread .    Patient received handouts and nutrition information regarding the specific recipes.

## 2019-02-01 ENCOUNTER — NON-PROVIDER VISIT (OUTPATIENT)
Dept: CARDIOLOGY | Facility: MEDICAL CENTER | Age: 54
End: 2019-02-01
Payer: COMMERCIAL

## 2019-02-01 DIAGNOSIS — Z95.5 STENTED CORONARY ARTERY: ICD-10-CM

## 2019-02-01 LAB — EKG IMPRESSION: NORMAL

## 2019-02-01 PROCEDURE — G0423 INTENS CARDIAC REHAB NO EXER: HCPCS | Mod: 59 | Performed by: FAMILY MEDICINE

## 2019-02-01 PROCEDURE — G0422 INTENS CARDIAC REHAB W/EXERC: HCPCS | Performed by: FAMILY MEDICINE

## 2019-02-01 NOTE — PROGRESS NOTES
Jayson Mccurdy attended: cooking school from  9:00-10:00am.   Today  prepared Horseradish Balsamic Dressing and Chopped Salad.    Patient received handouts and nutrition information regarding the specific recipes.

## 2019-02-08 ENCOUNTER — NON-PROVIDER VISIT (OUTPATIENT)
Dept: CARDIOLOGY | Facility: MEDICAL CENTER | Age: 54
End: 2019-02-08
Payer: COMMERCIAL

## 2019-02-08 DIAGNOSIS — Z95.5 STENTED CORONARY ARTERY: ICD-10-CM

## 2019-02-08 LAB — EKG IMPRESSION: NORMAL

## 2019-02-08 PROCEDURE — G0422 INTENS CARDIAC REHAB W/EXERC: HCPCS | Performed by: FAMILY MEDICINE

## 2019-02-08 PROCEDURE — G0423 INTENS CARDIAC REHAB NO EXER: HCPCS | Mod: 59 | Performed by: FAMILY MEDICINE

## 2019-05-22 ENCOUNTER — OFFICE VISIT (OUTPATIENT)
Dept: CARDIOLOGY | Facility: MEDICAL CENTER | Age: 54
End: 2019-05-22
Payer: COMMERCIAL

## 2019-05-22 VITALS
SYSTOLIC BLOOD PRESSURE: 118 MMHG | DIASTOLIC BLOOD PRESSURE: 60 MMHG | HEIGHT: 68 IN | WEIGHT: 178 LBS | OXYGEN SATURATION: 100 % | HEART RATE: 48 BPM | BODY MASS INDEX: 26.98 KG/M2

## 2019-05-22 DIAGNOSIS — I25.10 CORONARY ARTERY DISEASE INVOLVING NATIVE CORONARY ARTERY OF NATIVE HEART WITHOUT ANGINA PECTORIS: ICD-10-CM

## 2019-05-22 PROCEDURE — 99214 OFFICE O/P EST MOD 30 MIN: CPT | Performed by: NURSE PRACTITIONER

## 2019-05-22 RX ORDER — CARVEDILOL 3.12 MG/1
3.12 TABLET ORAL 2 TIMES DAILY WITH MEALS
Qty: 90 TAB | Refills: 3 | Status: SHIPPED | OUTPATIENT
Start: 2019-05-22 | End: 2019-11-26

## 2019-05-22 ASSESSMENT — ENCOUNTER SYMPTOMS
HEMOPTYSIS: 0
NAUSEA: 0
SPUTUM PRODUCTION: 0
WHEEZING: 0
CLAUDICATION: 0
SHORTNESS OF BREATH: 0
WEAKNESS: 0
PND: 0
VOMITING: 0
NERVOUS/ANXIOUS: 0
PALPITATIONS: 0
COUGH: 0
ORTHOPNEA: 0
DIZZINESS: 1

## 2019-05-22 NOTE — PROGRESS NOTES
Chief Complaint   Patient presents with   • Coronary Artery Disease       Subjective:   Jayson Mccurdy is a 53 y.o. male who presents today for CAD    He was seen by Dr. Diaz in the hospital, HX: CAD s/p PCI to mid circumflex 09/2018 for STEMI.     Patient is doing well.  He is a retired  currently working a [employer] as a .    He has been getting occasionally dizzy. Four weeks ago when he got up to the restroom in the middle of the night got very dizzy and had a fall. He did not hit his head.     He has had no episodes of chest pain, palpitations, shortness of breath, orthopnea, or peripheral edema.    He is normally very active.    Strong family history of early CAD.     Past Medical History:   Diagnosis Date   • Heart attack (HCC)    • High risk medication use 9/6/2018     History reviewed. No pertinent surgical history.  Family History   Problem Relation Age of Onset   • No Known Problems Mother    • Heart Attack Father 42        cabg   • Hypertension Father    • Heart Disease Father    • Heart Failure Father    • Heart Attack Brother 38   • Heart Attack Paternal Uncle 50     Social History     Social History   • Marital status:      Spouse name: N/A   • Number of children: N/A   • Years of education: N/A     Occupational History   • Not on file.     Social History Main Topics   • Smoking status: Never Smoker   • Smokeless tobacco: Never Used   • Alcohol use 0.6 oz/week     1 Glasses of wine per week      Comment: once a month    • Drug use: No   • Sexual activity: Not on file     Other Topics Concern   • Not on file     Social History Narrative   • No narrative on file     No Known Allergies  Outpatient Encounter Prescriptions as of 5/22/2019   Medication Sig Dispense Refill   • carvedilol (COREG) 3.125 MG Tab Take 1 Tab by mouth 2 times a day, with meals. 90 Tab 3   • losartan (COZAAR) 25 MG Tab Take 1 Tab by mouth every day. 90 Tab 3   • prasugrel (EFFIENT) 10  "MG Tab Take 1 Tab by mouth every day. 90 Tab 3   • rosuvastatin (CRESTOR) 40 MG tablet Take 1 Tab by mouth every evening. 90 Tab 3   • nitroglycerin (NITROSTAT) 0.4 MG SL Tab Place 1 Tab under tongue as needed for Chest Pain. 25 Tab 2   • aspirin EC 81 MG EC tablet Take 1 Tab by mouth every day. 100 Tab 1   • [DISCONTINUED] carvedilol (COREG) 6.25 MG Tab Take 1 Tab by mouth 2 times a day, with meals. 180 Tab 3     No facility-administered encounter medications on file as of 5/22/2019.      Review of Systems   Constitutional: Negative for malaise/fatigue.   Respiratory: Negative for cough, hemoptysis, sputum production, shortness of breath and wheezing.    Cardiovascular: Negative for chest pain, palpitations, orthopnea, claudication, leg swelling and PND.   Gastrointestinal: Negative for nausea and vomiting.   Neurological: Positive for dizziness. Negative for weakness.   Psychiatric/Behavioral: The patient is not nervous/anxious.         Objective:   /60 (BP Location: Left arm, Patient Position: Sitting, BP Cuff Size: Adult)   Pulse (!) 48   Ht 1.727 m (5' 8\")   Wt 80.7 kg (178 lb)   SpO2 100%   BMI 27.06 kg/m²     Physical Exam   Constitutional: He is oriented to person, place, and time. He appears well-developed and well-nourished. No distress.   HENT:   Head: Normocephalic and atraumatic.   Eyes: Pupils are equal, round, and reactive to light.   Neck: No JVD present.   Cardiovascular: Regular rhythm and normal heart sounds.  Bradycardia present.    No murmur heard.  Pulmonary/Chest: Effort normal. No respiratory distress. He has rales in the right lower field.   Abdominal: Soft. Bowel sounds are normal. He exhibits no distension.   Musculoskeletal: He exhibits no edema.   Neurological: He is alert and oriented to person, place, and time.   Skin: Skin is warm and dry. He is not diaphoretic.   Psychiatric: He has a normal mood and affect. His behavior is normal. Judgment and thought content normal. "       Echocardiography Laboratory  9/7/18  Technically difficult study - adequate information is obtained.   Left ventricular ejection fraction is visually estimated to be 55%.  Distal lateralwall not well visualized,but is probably mildly hypokinetic.  Remander of LV wallotion is normal.  The left atrium is normal in size.  Structurally normal mitral valve without significant stenosis or regurgitation.  Structurally normal aortic valve without significant stenosis or regurgitation.  Normal pericardium without effusion.    Cardiac catheterization   9/6/18  Findings: 100% mid Circ stented with good result.  Moderate stenosis in prox LAD and Mid RCA  EF 45% with distal inferior wall and apical akinesis    Assessment:     1. Coronary artery disease involving native coronary artery of native heart without angina pectoris  carvedilol (COREG) 3.125 MG Tab    Lipid Profile    Comp Metabolic Panel       Medical Decision Making:  Today's Assessment / Status / Plan:     1. CAD:  - Pt recovering well post stent placement.  He has not had any recurrence of chest pain or angina.  - sinus rosa with occasionally dizzy episode. We will reduce his coreg to 3.125mg BID.   - continue to take aspirin 81 mg,  losartan to 25 mg daily, Crestor 40 mg daily, Effient 10 mg daily  -has not needed to take nitroglycerin   - ECHO post PCI echocardiogram showed an ejection fraction of 55%    Follow up in 6 months with Dr. Diaz, lipid profile and CMP prior to next appointment.     Collaborating Provider: Dr. Bryan

## 2019-05-22 NOTE — LETTER
[facility]   [address]  [place] [address]  Phone: [phone]  Fax: [phone]              Jayson Mccurdy  1965    Encounter Date: 5/22/2019    Jacinto Charles          PROGRESS NOTE:  Chief Complaint   Patient presents with   • Coronary Artery Disease       Subjective:   Jayson Mccurdy is a 53 y.o. male who presents today for CAD    He was seen by Dr. Diaz in the hospital, HX: CAD s/p PCI to mid circumflex 09/2018 for STEMI.     Patient is doing well.  He is a retired  currently working a [employer] as a .    He has been getting occasionally dizzy. Four weeks ago when he got up to the restroom in the middle of the night got very dizzy and had a fall. He did not hit his head.     He has had no episodes of chest pain, palpitations, shortness of breath, orthopnea, or peripheral edema.    He is normally very active.    Strong family history of early CAD.     Past Medical History:   Diagnosis Date   • Heart attack (HCC)    • High risk medication use 9/6/2018     History reviewed. No pertinent surgical history.  Family History   Problem Relation Age of Onset   • No Known Problems Mother    • Heart Attack Father 42        cabg   • Hypertension Father    • Heart Disease Father    • Heart Failure Father    • Heart Attack Brother 38   • Heart Attack Paternal Uncle 50     Social History     Social History   • Marital status:      Spouse name: N/A   • Number of children: N/A   • Years of education: N/A     Occupational History   • Not on file.     Social History Main Topics   • Smoking status: Never Smoker   • Smokeless tobacco: Never Used   • Alcohol use 0.6 oz/week     1 Glasses of wine per week      Comment: once a month    • Drug use: No   • Sexual activity: Not on file     Other Topics Concern   • Not on file     Social History Narrative   • No narrative on file     No Known Allergies  Outpatient  "Encounter Prescriptions as of 5/22/2019   Medication Sig Dispense Refill   • carvedilol (COREG) 3.125 MG Tab Take 1 Tab by mouth 2 times a day, with meals. 90 Tab 3   • losartan (COZAAR) 25 MG Tab Take 1 Tab by mouth every day. 90 Tab 3   • prasugrel (EFFIENT) 10 MG Tab Take 1 Tab by mouth every day. 90 Tab 3   • rosuvastatin (CRESTOR) 40 MG tablet Take 1 Tab by mouth every evening. 90 Tab 3   • nitroglycerin (NITROSTAT) 0.4 MG SL Tab Place 1 Tab under tongue as needed for Chest Pain. 25 Tab 2   • aspirin EC 81 MG EC tablet Take 1 Tab by mouth every day. 100 Tab 1   • [DISCONTINUED] carvedilol (COREG) 6.25 MG Tab Take 1 Tab by mouth 2 times a day, with meals. 180 Tab 3     No facility-administered encounter medications on file as of 5/22/2019.      Review of Systems   Constitutional: Negative for malaise/fatigue.   Respiratory: Negative for cough, hemoptysis, sputum production, shortness of breath and wheezing.    Cardiovascular: Negative for chest pain, palpitations, orthopnea, claudication, leg swelling and PND.   Gastrointestinal: Negative for nausea and vomiting.   Neurological: Positive for dizziness. Negative for weakness.   Psychiatric/Behavioral: The patient is not nervous/anxious.         Objective:   /60 (BP Location: Left arm, Patient Position: Sitting, BP Cuff Size: Adult)   Pulse (!) 48   Ht 1.727 m (5' 8\")   Wt 80.7 kg (178 lb)   SpO2 100%   BMI 27.06 kg/m²      Physical Exam   Constitutional: He is oriented to person, place, and time. He appears well-developed and well-nourished. No distress.   HENT:   Head: Normocephalic and atraumatic.   Eyes: Pupils are equal, round, and reactive to light.   Neck: No JVD present.   Cardiovascular: Regular rhythm and normal heart sounds.  Bradycardia present.    No murmur heard.  Pulmonary/Chest: Effort normal. No respiratory distress. He has rales in the right lower field.   Abdominal: Soft. Bowel sounds are normal. He exhibits no distension.   "   Musculoskeletal: He exhibits no edema.   Neurological: He is alert and oriented to person, place, and time.   Skin: Skin is warm and dry. He is not diaphoretic.   Psychiatric: He has a normal mood and affect. His behavior is normal. Judgment and thought content normal.       Echocardiography Laboratory  9/7/18  Technically difficult study - adequate information is obtained.   Left ventricular ejection fraction is visually estimated to be 55%.  Distal lateralwall not well visualized,but is probably mildly hypokinetic.  Remander of LV wallotion is normal.  The left atrium is normal in size.  Structurally normal mitral valve without significant stenosis or regurgitation.  Structurally normal aortic valve without significant stenosis or regurgitation.  Normal pericardium without effusion.    Cardiac catheterization   9/6/18  Findings: 100% mid Circ stented with good result.  Moderate stenosis in prox LAD and Mid RCA  EF 45% with distal inferior wall and apical akinesis    Assessment:     1. Coronary artery disease involving native coronary artery of native heart without angina pectoris  carvedilol (COREG) 3.125 MG Tab    Lipid Profile    Comp Metabolic Panel       Medical Decision Making:  Today's Assessment / Status / Plan:     1. CAD:  - Pt recovering well post stent placement.  He has not had any recurrence of chest pain or angina.  - sinus rosa with occasionally dizzy episode. We will reduce his coreg to 3.125mg BID.   - continue to take aspirin 81 mg,  losartan to 25 mg daily, Crestor 40 mg daily, Effient 10 mg daily  -has not needed to take nitroglycerin   - ECHO post PCI echocardiogram showed an ejection fraction of 55%    Follow up in 6 months with Dr. Diaz, lipid profile and CMP prior to next appointment.     Collaborating Provider: Dr. Irvin Ash M.D.  [address] [place] [id number]  VIA Facsimile: [phone]

## 2019-08-19 ENCOUNTER — OFFICE VISIT (OUTPATIENT)
Dept: URGENT CARE | Facility: PHYSICIAN GROUP | Age: 54
End: 2019-08-19
Payer: COMMERCIAL

## 2019-08-19 VITALS
OXYGEN SATURATION: 97 % | DIASTOLIC BLOOD PRESSURE: 74 MMHG | TEMPERATURE: 98 F | WEIGHT: 180 LBS | BODY MASS INDEX: 27.37 KG/M2 | SYSTOLIC BLOOD PRESSURE: 118 MMHG | HEART RATE: 58 BPM | RESPIRATION RATE: 16 BRPM

## 2019-08-19 DIAGNOSIS — H10.9 CONJUNCTIVITIS OF RIGHT EYE, UNSPECIFIED CONJUNCTIVITIS TYPE: ICD-10-CM

## 2019-08-19 DIAGNOSIS — H01.9: Primary | ICD-10-CM

## 2019-08-19 DIAGNOSIS — S00.219A: Primary | ICD-10-CM

## 2019-08-19 PROCEDURE — 99214 OFFICE O/P EST MOD 30 MIN: CPT | Performed by: PHYSICIAN ASSISTANT

## 2019-08-19 RX ORDER — AMOXICILLIN AND CLAVULANATE POTASSIUM 875; 125 MG/1; MG/1
1 TABLET, FILM COATED ORAL 2 TIMES DAILY
Qty: 14 TAB | Refills: 0 | Status: SHIPPED | OUTPATIENT
Start: 2019-08-19 | End: 2019-08-26

## 2019-08-19 RX ORDER — POLYMYXIN B SULFATE AND TRIMETHOPRIM 1; 10000 MG/ML; [USP'U]/ML
1 SOLUTION OPHTHALMIC 4 TIMES DAILY
Qty: 10 ML | Refills: 0 | Status: SHIPPED | OUTPATIENT
Start: 2019-08-19 | End: 2019-08-26

## 2019-08-21 ASSESSMENT — ENCOUNTER SYMPTOMS
BLURRED VISION: 0
FEVER: 0
EYE REDNESS: 1
DOUBLE VISION: 0
EYE DISCHARGE: 1
EYE PAIN: 0
PHOTOPHOBIA: 0

## 2019-08-22 NOTE — PROGRESS NOTES
Subjective:      Jayson Mccurdy is a 54 y.o. male who presents with Eye Drainage (possible pink eye x 1 week,goopy in the morning,red,watery)    PMH:  has a past medical history of Heart attack (HCC) and High risk medication use (9/6/2018).  MEDS:   Current Outpatient Medications:   •  carvedilol (COREG) 3.125 MG Tab, Take 1 Tab by mouth 2 times a day, with meals., Disp: 90 Tab, Rfl: 3  •  losartan (COZAAR) 25 MG Tab, Take 1 Tab by mouth every day., Disp: 90 Tab, Rfl: 3  •  prasugrel (EFFIENT) 10 MG Tab, Take 1 Tab by mouth every day., Disp: 90 Tab, Rfl: 3  •  rosuvastatin (CRESTOR) 40 MG tablet, Take 1 Tab by mouth every evening., Disp: 90 Tab, Rfl: 3  •  nitroglycerin (NITROSTAT) 0.4 MG SL Tab, Place 1 Tab under tongue as needed for Chest Pain., Disp: 25 Tab, Rfl: 2  •  aspirin EC 81 MG EC tablet, Take 1 Tab by mouth every day., Disp: 100 Tab, Rfl: 1  ALLERGIES: No Known Allergies  SURGHX: History reviewed. No pertinent surgical history.  SOCHX:  reports that he has never smoked. He has never used smokeless tobacco. He reports that he drinks about 0.6 oz of alcohol per week. He reports that he does not use drugs.  FH: Reviewed with patient, not pertinent to this visit.           Patient presents with:  Eye Drainage: possible pink eye x 1 week,goopy in the morning,red,watery. Scab to right eyelid, pt thinks he scratched his lid in his sleep due to the goopy discharge  and now may be infected.  No vision changes or globe injury.       Eye Drainage   This is a new problem. The current episode started in the past 7 days. The problem occurs constantly. The problem has been gradually worsening. Pertinent negatives include no fever. Nothing aggravates the symptoms. He has tried NSAIDs (compresses, otc eye drops) for the symptoms. The treatment provided no relief.       Review of Systems   Constitutional: Negative for fever.   Eyes: Positive for discharge and redness. Negative for blurred vision, double vision,  photophobia and pain.   All other systems reviewed and are negative.         Objective:     /74 (BP Location: Left arm, Patient Position: Sitting, BP Cuff Size: Adult)   Pulse (!) 58   Temp 36.7 °C (98 °F) (Temporal)   Resp 16   Wt 81.6 kg (180 lb)   SpO2 97%   BMI 27.37 kg/m²      Physical Exam   Constitutional: He is oriented to person, place, and time. He appears well-developed and well-nourished. No distress.   HENT:   Head: Normocephalic and atraumatic.   Nose: Nose normal.   Mouth/Throat: Oropharynx is clear and moist.   Eyes: Pupils are equal, round, and reactive to light. EOM are normal. Lids are everted and swept, no foreign bodies found. Right eye exhibits discharge and exudate. Right eye exhibits no chemosis and no hordeolum. No foreign body present in the right eye. Right conjunctiva is injected. No scleral icterus.       Neck: Normal range of motion. Neck supple. No JVD present.   Cardiovascular: Normal rate, regular rhythm and normal heart sounds.   Pulmonary/Chest: Effort normal and breath sounds normal.   Abdominal: Soft.   Musculoskeletal: Normal range of motion.   Lymphadenopathy:     He has no cervical adenopathy.   Neurological: He is alert and oriented to person, place, and time.   Skin: Skin is warm and dry.               Assessment/Plan:     1. Infected abrasion of eyelid, initial encounter, right  polymixin-trimethoprim (POLYTRIM) 35690-8.1 UNIT/ML-% Solution    amoxicillin-clavulanate (AUGMENTIN) 875-125 MG Tab   2. Conjunctivitis of right eye, unspecified conjunctivitis type  polymixin-trimethoprim (POLYTRIM) 29739-0.1 UNIT/ML-% Solution    amoxicillin-clavulanate (AUGMENTIN) 875-125 MG Tab     PT can use cool/warm compress on affected area for relief of symptoms.     Motrin/Advil/Ibuprophen 600 mg every 6 hours as needed for pain or fever.    PT should follow up with PCP in 1-2 days for re-evaluation if symptoms have not improved.  Discussed red flags and reasons to return to  UC or ED.  Pt and/or family verbalized understanding of diagnosis and follow up instructions and was offered informational handout on diagnosis.  PT discharged.

## 2019-08-28 ENCOUNTER — TELEPHONE (OUTPATIENT)
Dept: MEDICAL GROUP | Facility: LAB | Age: 54
End: 2019-08-28

## 2019-08-28 RX ORDER — CIPROFLOXACIN 250 MG/1
250 TABLET, FILM COATED ORAL 2 TIMES DAILY
Qty: 10 TAB | Refills: 0 | Status: SHIPPED | OUTPATIENT
Start: 2019-08-28 | End: 2019-09-02

## 2019-08-28 RX ORDER — AZITHROMYCIN 250 MG/1
TABLET, FILM COATED ORAL
Qty: 6 TAB | Refills: 0 | Status: SHIPPED | OUTPATIENT
Start: 2019-08-28

## 2019-08-28 RX ORDER — OSELTAMIVIR PHOSPHATE 75 MG/1
75 CAPSULE ORAL 2 TIMES DAILY
Qty: 10 CAP | Refills: 0 | Status: SHIPPED | OUTPATIENT
Start: 2019-08-28

## 2019-08-28 RX ORDER — METRONIDAZOLE 500 MG/1
500 TABLET ORAL 3 TIMES DAILY
Qty: 14 TAB | Refills: 0 | Status: SHIPPED | OUTPATIENT
Start: 2019-08-28

## 2019-09-30 DIAGNOSIS — I25.110 CORONARY ARTERY DISEASE INVOLVING NATIVE HEART WITH UNSTABLE ANGINA PECTORIS, UNSPECIFIED VESSEL OR LESION TYPE (HCC): ICD-10-CM

## 2019-09-30 RX ORDER — PRASUGREL 10 MG/1
TABLET, FILM COATED ORAL
Qty: 90 TAB | Refills: 3 | Status: SHIPPED | OUTPATIENT
Start: 2019-09-30 | End: 2019-11-26

## 2019-09-30 RX ORDER — ROSUVASTATIN CALCIUM 40 MG/1
TABLET, COATED ORAL
Qty: 90 TAB | Refills: 3 | Status: SHIPPED | OUTPATIENT
Start: 2019-09-30 | End: 2020-04-21 | Stop reason: SDUPTHER

## 2019-09-30 RX ORDER — LOSARTAN POTASSIUM 25 MG/1
TABLET ORAL
Qty: 90 TAB | Refills: 3 | Status: SHIPPED | OUTPATIENT
Start: 2019-09-30 | End: 2020-04-21 | Stop reason: SDUPTHER

## 2019-11-26 ENCOUNTER — OFFICE VISIT (OUTPATIENT)
Dept: CARDIOLOGY | Facility: MEDICAL CENTER | Age: 54
End: 2019-11-26
Payer: COMMERCIAL

## 2019-11-26 VITALS
SYSTOLIC BLOOD PRESSURE: 118 MMHG | HEART RATE: 58 BPM | DIASTOLIC BLOOD PRESSURE: 80 MMHG | WEIGHT: 187 LBS | BODY MASS INDEX: 28.34 KG/M2 | OXYGEN SATURATION: 97 % | HEIGHT: 68 IN

## 2019-11-26 DIAGNOSIS — I21.21 ST ELEVATION MYOCARDIAL INFARCTION INVOLVING LEFT CIRCUMFLEX CORONARY ARTERY (HCC): ICD-10-CM

## 2019-11-26 DIAGNOSIS — I25.10 CORONARY ARTERY DISEASE INVOLVING NATIVE CORONARY ARTERY OF NATIVE HEART WITHOUT ANGINA PECTORIS: ICD-10-CM

## 2019-11-26 PROBLEM — E66.3 OVERWEIGHT: Status: RESOLVED | Noted: 2018-09-06 | Resolved: 2019-11-26

## 2019-11-26 PROCEDURE — 99213 OFFICE O/P EST LOW 20 MIN: CPT | Performed by: INTERNAL MEDICINE

## 2019-11-26 ASSESSMENT — ENCOUNTER SYMPTOMS
MUSCULOSKELETAL NEGATIVE: 1
RESPIRATORY NEGATIVE: 1
GASTROINTESTINAL NEGATIVE: 1
NEUROLOGICAL NEGATIVE: 1
CONSTITUTIONAL NEGATIVE: 1

## 2019-11-26 NOTE — PROGRESS NOTES
"Chief Complaint   Patient presents with   • Coronary Artery Disease     Previous patient        Subjective:   Jayson Mccurdy is a 54 y.o. male who presents today for follow-up of an acute inferior wall MI in September, 2018.  At that time he was found to have occlusion of his mid circumflex which was stented with a 3.25 x 23 mm Xience stent.  He had moderate disease in his LAD and right coronary artery.  The patient exercises regularly and has no chest pain or exertional dyspnea.  He does some cross training and also walks very fast on treadmill.  This does not cause any symptoms at all.  He have occasional mild and \"deep\" localized discomfort that is not at all exertional.  This is not, with stress.  No other interval medical problems.  He is due for lipid profile.     Past Medical History:   Diagnosis Date   • Heart attack (HCC)    • High risk medication use 9/6/2018     No past surgical history on file.  Family History   Problem Relation Age of Onset   • No Known Problems Mother    • Heart Attack Father 42        cabg   • Hypertension Father    • Heart Disease Father    • Heart Failure Father    • Heart Attack Brother 38   • Heart Attack Paternal Uncle 50     Social History     Socioeconomic History   • Marital status:      Spouse name: Not on file   • Number of children: Not on file   • Years of education: Not on file   • Highest education level: Not on file   Occupational History   • Not on file   Social Needs   • Financial resource strain: Not on file   • Food insecurity:     Worry: Not on file     Inability: Not on file   • Transportation needs:     Medical: Not on file     Non-medical: Not on file   Tobacco Use   • Smoking status: Never Smoker   • Smokeless tobacco: Never Used   Substance and Sexual Activity   • Alcohol use: Yes     Alcohol/week: 0.6 oz     Types: 1 Glasses of wine per week     Comment: once a month    • Drug use: No   • Sexual activity: Not on file   Lifestyle   • Physical " activity:     Days per week: Not on file     Minutes per session: Not on file   • Stress: Not on file   Relationships   • Social connections:     Talks on phone: Not on file     Gets together: Not on file     Attends Bahai service: Not on file     Active member of club or organization: Not on file     Attends meetings of clubs or organizations: Not on file     Relationship status: Not on file   • Intimate partner violence:     Fear of current or ex partner: Not on file     Emotionally abused: Not on file     Physically abused: Not on file     Forced sexual activity: Not on file   Other Topics Concern   • Not on file   Social History Narrative   • Not on file     No Known Allergies  Outpatient Encounter Medications as of 11/26/2019   Medication Sig Dispense Refill   • rosuvastatin (CRESTOR) 40 MG tablet TAKE ONE TABLET BY MOUTH IN THE EVENING  90 Tab 3   • losartan (COZAAR) 25 MG Tab TAKE ONE TABLET BY MOUTH ONE TIME DAILY  90 Tab 3   • nitroglycerin (NITROSTAT) 0.4 MG SL Tab Place 1 Tab under tongue as needed for Chest Pain. 25 Tab 2   • aspirin EC 81 MG EC tablet Take 1 Tab by mouth every day. 100 Tab 1   • [DISCONTINUED] prasugrel (EFFIENT) 10 MG Tab TAKE ONE TABLET BY MOUTH ONE TIME DAILY  90 Tab 3   • azithromycin (ZITHROMAX) 250 MG Tab Take 2 tablets PO on day #1, then 1 tablet PO daily on days #2-5. (Patient not taking: Reported on 11/26/2019) 6 Tab 0   • metroNIDAZOLE (FLAGYL) 500 MG Tab Take 1 Tab by mouth 3 times a day. (Patient not taking: Reported on 11/26/2019) 14 Tab 0   • oseltamivir (TAMIFLU) 75 MG Cap Take 1 Cap by mouth 2 times a day. (Patient not taking: Reported on 11/26/2019) 10 Cap 0   • [DISCONTINUED] carvedilol (COREG) 3.125 MG Tab Take 1 Tab by mouth 2 times a day, with meals. 90 Tab 3     No facility-administered encounter medications on file as of 11/26/2019.      Review of Systems   Constitutional: Negative.    HENT: Negative.    Respiratory: Negative.    Cardiovascular: Positive for  "chest pain.        Localized nonexertional chest pain   Gastrointestinal: Negative.    Musculoskeletal: Negative.    Skin: Negative.    Neurological: Negative.    Endo/Heme/Allergies: Negative.         Objective:   /80 (BP Location: Left arm, Patient Position: Sitting, BP Cuff Size: Adult)   Pulse (!) 58   Ht 1.727 m (5' 8\")   Wt 84.8 kg (187 lb)   SpO2 97%   BMI 28.43 kg/m²     Physical Exam   Constitutional: He is oriented to person, place, and time. He appears well-nourished. No distress.   HENT:   Head: Normocephalic and atraumatic.   Eyes: Pupils are equal, round, and reactive to light. No scleral icterus.   Neck: No JVD present. No tracheal deviation present.   Cardiovascular: Normal rate and regular rhythm.   No murmur heard.  Pulmonary/Chest: Breath sounds normal. No respiratory distress. He has no wheezes.   Abdominal: Soft. Bowel sounds are normal. He exhibits no distension. There is no tenderness.   Musculoskeletal:         General: No edema.   Neurological: He is alert and oriented to person, place, and time.   Skin: Skin is warm and dry.   Psychiatric: He has a normal mood and affect.       Assessment:     1. Coronary artery disease involving native coronary artery of native heart without angina pectoris  Lipid Profile   2. ST elevation myocardial infarction involving left circumflex coronary artery (HCC)  Lipid Profile       Medical Decision Making:  Today's Assessment / Status / Plan:   Status post inferior wall MI: He is a little over a year post event.  He will be taken off his carvedilol and he is Effient.  He is given written instructions regarding this.  Continue low-dose losartan for now.  Check lipid profile.  Return one year.  Plan stress test at that time.  "

## 2020-04-21 DIAGNOSIS — I25.110 CORONARY ARTERY DISEASE INVOLVING NATIVE HEART WITH UNSTABLE ANGINA PECTORIS, UNSPECIFIED VESSEL OR LESION TYPE (HCC): ICD-10-CM

## 2020-04-21 RX ORDER — LOSARTAN POTASSIUM 25 MG/1
25 TABLET ORAL DAILY
Qty: 90 TAB | Refills: 2 | Status: SHIPPED | OUTPATIENT
Start: 2020-04-21 | End: 2020-12-01

## 2020-04-21 RX ORDER — ROSUVASTATIN CALCIUM 40 MG/1
40 TABLET, COATED ORAL DAILY
Qty: 90 TAB | Refills: 2 | Status: SHIPPED | OUTPATIENT
Start: 2020-04-21 | End: 2020-12-01

## 2020-11-26 DIAGNOSIS — I25.110 CORONARY ARTERY DISEASE INVOLVING NATIVE HEART WITH UNSTABLE ANGINA PECTORIS, UNSPECIFIED VESSEL OR LESION TYPE (HCC): ICD-10-CM

## 2020-12-01 RX ORDER — ROSUVASTATIN CALCIUM 40 MG/1
TABLET, COATED ORAL
Qty: 30 TAB | Refills: 2 | Status: SHIPPED | OUTPATIENT
Start: 2020-12-01 | End: 2021-02-22

## 2020-12-01 RX ORDER — LOSARTAN POTASSIUM 25 MG/1
TABLET ORAL
Qty: 30 TAB | Refills: 2 | Status: SHIPPED | OUTPATIENT
Start: 2020-12-01 | End: 2021-02-22

## 2021-02-22 DIAGNOSIS — I25.110 CORONARY ARTERY DISEASE INVOLVING NATIVE HEART WITH UNSTABLE ANGINA PECTORIS, UNSPECIFIED VESSEL OR LESION TYPE (HCC): ICD-10-CM

## 2021-02-22 RX ORDER — ROSUVASTATIN CALCIUM 40 MG/1
TABLET, COATED ORAL
Qty: 90 TABLET | Refills: 3 | Status: SHIPPED | OUTPATIENT
Start: 2021-02-22 | End: 2022-02-07 | Stop reason: SDUPTHER

## 2021-02-22 RX ORDER — LOSARTAN POTASSIUM 25 MG/1
TABLET ORAL
Qty: 90 TABLET | Refills: 3 | Status: SHIPPED | OUTPATIENT
Start: 2021-02-22 | End: 2022-02-07 | Stop reason: SDUPTHER

## 2021-03-15 DIAGNOSIS — Z23 NEED FOR VACCINATION: ICD-10-CM

## 2022-02-07 DIAGNOSIS — I25.110 CORONARY ARTERY DISEASE INVOLVING NATIVE HEART WITH UNSTABLE ANGINA PECTORIS, UNSPECIFIED VESSEL OR LESION TYPE (HCC): ICD-10-CM

## 2022-02-07 RX ORDER — ROSUVASTATIN CALCIUM 40 MG/1
40 TABLET, COATED ORAL
Qty: 90 TABLET | Refills: 0 | Status: SHIPPED | OUTPATIENT
Start: 2022-02-07

## 2022-02-07 RX ORDER — LOSARTAN POTASSIUM 25 MG/1
25 TABLET ORAL
Qty: 30 TABLET | Refills: 0 | Status: SHIPPED | OUTPATIENT
Start: 2022-02-07

## 2022-03-03 DIAGNOSIS — I25.110 CORONARY ARTERY DISEASE INVOLVING NATIVE HEART WITH UNSTABLE ANGINA PECTORIS, UNSPECIFIED VESSEL OR LESION TYPE (HCC): ICD-10-CM

## 2022-03-03 RX ORDER — LOSARTAN POTASSIUM 25 MG/1
TABLET ORAL
Qty: 90 TABLET | Refills: 0 | OUTPATIENT
Start: 2022-03-03

## 2022-03-03 NOTE — TELEPHONE ENCOUNTER
Refill refused due to pt not being seen by cardiology since 2/2019. Per notes, pt now lives in [place].

## 2023-01-24 NOTE — PROGRESS NOTES
Cardiology Progress Note               Author: Franchesca Cruz Date & Time created: 2018  10:13 AM     Interval History:    18, LHC, PCI to mLCX, 40% proximal LAD, 50% LAD, 40% proximal RCA, EF 45%, ( VFIb during the procedure, was shocked x 1 , Afib post cardioversion, transiently       Chief Complaint:  Chest pain , found to have ST elevation inferior MI     Hx of + family hx of CAD, father with MI at age of 50, kidney stones       Labs reviewed  Na, K, Cr stable     Echo pending     /68  HR 50-60's     Review of Systems   Respiratory: Negative for cough and shortness of breath.    Cardiovascular: Negative for chest pain, palpitations, orthopnea, claudication, leg swelling and PND.       Physical Exam   Constitutional: He is oriented to person, place, and time. He appears well-developed.   HENT:   Head: Normocephalic.   Eyes: Conjunctivae are normal.   Neck: No thyromegaly present.   Cardiovascular: Normal rate and regular rhythm.    Pulses:       Carotid pulses are 2+ on the right side, and 2+ on the left side.       Radial pulses are 2+ on the right side, and 2+ on the left side.        Posterior tibial pulses are 2+ on the right side, and 2+ on the left side.   Pulmonary/Chest: He has no wheezes.   Abdominal: Soft.   Musculoskeletal: He exhibits no edema.   Neurological: He is alert and oriented to person, place, and time.   Skin: Skin is warm and dry.       Hemodynamics:  Temp (24hrs), Av.7 °C (98.1 °F), Min:36.3 °C (97.3 °F), Max:37.3 °C (99.2 °F)  Temperature: 37.3 °C (99.2 °F)  Pulse  Av  Min: 55  Max: 124Heart Rate (Monitored): 66  Blood Pressure: 104/68, NIBP: 110/63     Respiratory:    Respiration: 20, Pulse Oximetry: 97 %        RUL Breath Sounds: Clear, RML Breath Sounds: Clear, RLL Breath Sounds: Diminished, BRI Breath Sounds: Clear, LLL Breath Sounds: Diminished  Fluids:     Weight: 80.5 kg (177 lb 7.5 oz)  GI/Nutrition:  Orders Placed This Encounter   Procedures   • Diet Order  Cardiac (No Sausage or Pork)     Standing Status:   Standing     Number of Occurrences:   1     Order Specific Question:   Diet:     Answer:   Cardiac [6]     Comments:   No Sausage or Pork     Lab Results:  Recent Labs      09/05/18 2248 09/06/18   0535   WBC  10.2  10.3   RBC  5.39  4.82   HEMOGLOBIN  15.1  13.5*   HEMATOCRIT  43.9  38.9*   MCV  81.4  80.7*   MCH  28.0  28.0   MCHC  34.4  34.7   RDW  36.5  36.0   PLATELETCT  246  201   MPV  9.9  9.9     Recent Labs      09/05/18 2248 09/06/18   0535   SODIUM  141  138   POTASSIUM  3.3*  4.0   CHLORIDE  108  111   CO2  25  21   GLUCOSE  140*  128*   BUN  20  18   CREATININE  1.29  1.03   CALCIUM  9.5  8.1*     Recent Labs      09/05/18 2248   APTT  21.7*   INR  1.04     Recent Labs      09/05/18 2248   BNPBTYPENAT  11     Recent Labs      09/05/18 2248 09/06/18   0535   TROPONINI  0.11*  39.50*   BNPBTYPENAT  11   --      Recent Labs      09/06/18   0535   TRIGLYCERIDE  90   HDL  49   LDL  63         Medical Decision Making, by Problem:  Active Hospital Problems    Diagnosis   • *STEMI (ST elevation myocardial infarction) (HCC) [I21.3]   • CAD (coronary artery disease) [I25.10]   • Overweight [E66.3]   • High risk medication use [Z79.899]       Plan:  Plan is to continue with optimal medical therapy  Patient will be discharged on aspirin 81 mg, Effient 10 mg, carvedilol 6.25 mg BID , losartan 25 mg, Crestor 40 mg  Echo pending   Appointment with our cardiology office on 10/8/18  No rhythm issues overnight  No recurrence of A. Fib  No need for anticoagulation         Quality-Core Measures   Prophylactic measure